# Patient Record
Sex: MALE | Race: WHITE | Employment: FULL TIME | URBAN - METROPOLITAN AREA
[De-identification: names, ages, dates, MRNs, and addresses within clinical notes are randomized per-mention and may not be internally consistent; named-entity substitution may affect disease eponyms.]

---

## 2017-12-11 ENCOUNTER — OFFICE VISIT (OUTPATIENT)
Dept: PRIMARY CARE CLINIC | Age: 22
End: 2017-12-11

## 2017-12-11 VITALS
TEMPERATURE: 97.7 F | DIASTOLIC BLOOD PRESSURE: 78 MMHG | HEIGHT: 69 IN | BODY MASS INDEX: 39.25 KG/M2 | WEIGHT: 265 LBS | HEART RATE: 80 BPM | RESPIRATION RATE: 14 BRPM | SYSTOLIC BLOOD PRESSURE: 122 MMHG

## 2017-12-11 DIAGNOSIS — E55.9 VITAMIN D DEFICIENCY: ICD-10-CM

## 2017-12-11 DIAGNOSIS — F33.9 RECURRENT DEPRESSION (HCC): ICD-10-CM

## 2017-12-11 DIAGNOSIS — E78.5 HYPERLIPIDEMIA, UNSPECIFIED HYPERLIPIDEMIA TYPE: ICD-10-CM

## 2017-12-11 DIAGNOSIS — R53.83 FATIGUE, UNSPECIFIED TYPE: ICD-10-CM

## 2017-12-11 DIAGNOSIS — E78.5 HYPERLIPIDEMIA, UNSPECIFIED HYPERLIPIDEMIA TYPE: Primary | ICD-10-CM

## 2017-12-11 DIAGNOSIS — F41.9 ANXIETY: ICD-10-CM

## 2017-12-11 DIAGNOSIS — Z23 NEED FOR INFLUENZA VACCINATION: ICD-10-CM

## 2017-12-11 LAB
ALBUMIN SERPL-MCNC: 4.5 G/DL (ref 3.9–4.9)
ALP BLD-CCNC: 112 U/L (ref 35–104)
ALT SERPL-CCNC: 37 U/L (ref 0–41)
ANION GAP SERPL CALCULATED.3IONS-SCNC: 13 MEQ/L (ref 9–17)
AST SERPL-CCNC: 25 U/L (ref 0–40)
BASOPHILS ABSOLUTE: 0 K/UL (ref 0–0.2)
BASOPHILS RELATIVE PERCENT: 0.7 %
BILIRUB SERPL-MCNC: 0.5 MG/DL (ref 0–1.2)
BUN BLDV-MCNC: 15 MG/DL (ref 6–20)
CALCIUM SERPL-MCNC: 9.2 MG/DL (ref 8.6–10.2)
CHLORIDE BLD-SCNC: 102 MEQ/L (ref 97–107)
CHOLESTEROL, TOTAL: 125 MG/DL (ref 0–199)
CO2: 26 MEQ/L (ref 17–24)
CREAT SERPL-MCNC: 0.76 MG/DL (ref 0.7–1.2)
EOSINOPHILS ABSOLUTE: 0.2 K/UL (ref 0–0.7)
EOSINOPHILS RELATIVE PERCENT: 3.7 %
GFR AFRICAN AMERICAN: >60
GFR NON-AFRICAN AMERICAN: >60
GLOBULIN: 3 G/DL (ref 2.3–3.5)
GLUCOSE BLD-MCNC: 77 MG/DL (ref 74–109)
HCT VFR BLD CALC: 45.2 % (ref 42–52)
HDLC SERPL-MCNC: 26 MG/DL (ref 40–59)
HEMOGLOBIN: 15.2 G/DL (ref 14–18)
LDL CHOLESTEROL CALCULATED: 69 MG/DL (ref 0–129)
LYMPHOCYTES ABSOLUTE: 1.4 K/UL (ref 1–4.8)
LYMPHOCYTES RELATIVE PERCENT: 23.3 %
MCH RBC QN AUTO: 27.5 PG (ref 27–31.3)
MCHC RBC AUTO-ENTMCNC: 33.7 % (ref 33–37)
MCV RBC AUTO: 81.6 FL (ref 80–100)
MONOCYTES ABSOLUTE: 0.5 K/UL (ref 0.2–0.8)
MONOCYTES RELATIVE PERCENT: 8.4 %
NEUTROPHILS ABSOLUTE: 3.8 K/UL (ref 1.4–6.5)
NEUTROPHILS RELATIVE PERCENT: 63.9 %
PDW BLD-RTO: 13.7 % (ref 11.5–14.5)
PLATELET # BLD: 206 K/UL (ref 130–400)
POTASSIUM SERPL-SCNC: 4.2 MEQ/L (ref 3.2–4.4)
RBC # BLD: 5.53 M/UL (ref 4.7–6.1)
SODIUM BLD-SCNC: 141 MEQ/L (ref 132–138)
T4 TOTAL: 6.7 UG/DL (ref 4.5–11.7)
TOTAL PROTEIN: 7.5 G/DL (ref 6.4–8.1)
TRIGL SERPL-MCNC: 149 MG/DL (ref 0–200)
TSH SERPL DL<=0.05 MIU/L-ACNC: 2.38 UIU/ML (ref 0.27–4.2)
VITAMIN D 25-HYDROXY: 14.1 NG/ML (ref 30–100)
WBC # BLD: 6 K/UL (ref 4.8–10.8)

## 2017-12-11 PROCEDURE — 90471 IMMUNIZATION ADMIN: CPT | Performed by: FAMILY MEDICINE

## 2017-12-11 PROCEDURE — 90686 IIV4 VACC NO PRSV 0.5 ML IM: CPT | Performed by: FAMILY MEDICINE

## 2017-12-11 PROCEDURE — 99204 OFFICE O/P NEW MOD 45 MIN: CPT | Performed by: FAMILY MEDICINE

## 2017-12-11 RX ORDER — FLUOXETINE HYDROCHLORIDE 20 MG/1
20 CAPSULE ORAL DAILY
Qty: 30 CAPSULE | Refills: 1 | Status: SHIPPED | OUTPATIENT
Start: 2017-12-11 | End: 2018-01-04 | Stop reason: SDUPTHER

## 2017-12-11 ASSESSMENT — ENCOUNTER SYMPTOMS
ABDOMINAL DISTENTION: 0
CHEST TIGHTNESS: 0
SHORTNESS OF BREATH: 0
ABDOMINAL PAIN: 0

## 2017-12-11 ASSESSMENT — PATIENT HEALTH QUESTIONNAIRE - PHQ9
1. LITTLE INTEREST OR PLEASURE IN DOING THINGS: 0
SUM OF ALL RESPONSES TO PHQ9 QUESTIONS 1 & 2: 1
SUM OF ALL RESPONSES TO PHQ QUESTIONS 1-9: 1
2. FEELING DOWN, DEPRESSED OR HOPELESS: 1

## 2017-12-11 NOTE — PROGRESS NOTES
Vaccine Information Sheet, \"Influenza - Inactivated\"  given to Jose Cruz Sat, or parent/legal guardian of  Jose Cruz Boykin and verbalized understanding. Patient responses:    Have you ever had a reaction to a flu vaccine? No  Are you able to eat eggs without adverse effects? Yes  Do you have any current illness? No  Have you ever had Guillian Kanorado Syndrome? No    Flu vaccine given per order. Please see immunization tab.
recorded. GENERAL:  The patient appears well nourished and well-developed, and with normal affect. No acute respiratory distress. Alert and oriented times 3. No skin rashes. HEENT:  TMs normal bilaterally. Canals and ears normal. Pharynx is clear. Extraocular eye motions intact and pain free. Pupils reactive and equally round. Sclerae and conjunctivae clear, normocephalic and atraumatic. RESPIRATORY:  Clear and equal breath sounds with no acute respiratory distress. HEART: Regular rhythm without murmur, rub or gallop. ABDOMEN:  soft, nontender. No masses, guarding or rebound. Normoactive bowel sounds. LOW BACK: No tenderness to palpation of inferior lumbar spine or either sacroiliac joint area. Assessment:     1. Hyperlipidemia, unspecified hyperlipidemia type  Lipid Panel   2. Need for influenza vaccination  INFLUENZA, QUADV, 18 YRS AND OLDER, IM PF, PREFILL SYR OR SDV, 0.5ML (AFLURIA QUADV, PF)   3. Anxiety  FLUoxetine (PROZAC) 20 MG capsule   4. Fatigue, unspecified type  CBC Auto Differential    Comprehensive Metabolic Panel    TSH without Reflex    T3, Uptake    T4   5. Vitamin D deficiency  Vitamin D 25 Hydroxy   6.  Recurrent depression ([facility] [address]         Plan:      Orders Placed This Encounter   Procedures    INFLUENZA, QUADV, 18 YRS AND OLDER, IM PF, PREFILL SYR OR SDV, 0.5ML (AFLURIA QUADV, PF)    CBC Auto Differential     Standing Status:   Future     Number of Occurrences:   1     Standing Expiration Date:   12/11/2018    Comprehensive Metabolic Panel     Standing Status:   Future     Number of Occurrences:   1     Standing Expiration Date:   12/11/2018    TSH without Reflex     Standing Status:   Future     Number of Occurrences:   1     Standing Expiration Date:   12/11/2018    T3, Uptake     Standing Status:   Future     Number of Occurrences:   1     Standing Expiration Date:   12/11/2018    T4     Standing Status:   Future     Number of Occurrences:   1

## 2017-12-13 LAB — T3 UPTAKE: 34 % (ref 28–41)

## 2018-01-04 ENCOUNTER — OFFICE VISIT (OUTPATIENT)
Dept: PRIMARY CARE CLINIC | Age: 23
End: 2018-01-04

## 2018-01-04 VITALS
BODY MASS INDEX: 38.06 KG/M2 | TEMPERATURE: 97.6 F | RESPIRATION RATE: 17 BRPM | HEART RATE: 87 BPM | DIASTOLIC BLOOD PRESSURE: 70 MMHG | HEIGHT: 69 IN | SYSTOLIC BLOOD PRESSURE: 106 MMHG | WEIGHT: 257 LBS

## 2018-01-04 DIAGNOSIS — F41.9 ANXIETY: ICD-10-CM

## 2018-01-04 DIAGNOSIS — E55.9 VITAMIN D DEFICIENCY: ICD-10-CM

## 2018-01-04 DIAGNOSIS — F33.9 RECURRENT DEPRESSION (HCC): Primary | ICD-10-CM

## 2018-01-04 PROCEDURE — 99214 OFFICE O/P EST MOD 30 MIN: CPT | Performed by: FAMILY MEDICINE

## 2018-01-04 RX ORDER — CHOLECALCIFEROL (VITAMIN D3) 50 MCG
2000 TABLET ORAL DAILY
Qty: 90 TABLET | Refills: 1 | Status: SHIPPED | OUTPATIENT
Start: 2018-01-04 | End: 2018-09-06 | Stop reason: ALTCHOICE

## 2018-01-04 RX ORDER — FLUOXETINE HYDROCHLORIDE 20 MG/1
20 CAPSULE ORAL DAILY
Qty: 30 CAPSULE | Refills: 1 | Status: SHIPPED | OUTPATIENT
Start: 2018-01-04 | End: 2018-03-08 | Stop reason: SDUPTHER

## 2018-01-04 ASSESSMENT — ENCOUNTER SYMPTOMS
CHEST TIGHTNESS: 0
SHORTNESS OF BREATH: 0

## 2018-03-08 DIAGNOSIS — F41.9 ANXIETY: ICD-10-CM

## 2018-03-09 RX ORDER — FLUOXETINE HYDROCHLORIDE 20 MG/1
20 CAPSULE ORAL DAILY
Qty: 30 CAPSULE | Refills: 3 | Status: SHIPPED | OUTPATIENT
Start: 2018-03-09 | End: 2018-09-06

## 2018-09-06 ENCOUNTER — PATIENT MESSAGE (OUTPATIENT)
Dept: FAMILY MEDICINE CLINIC | Age: 23
End: 2018-09-06

## 2018-09-06 ENCOUNTER — OFFICE VISIT (OUTPATIENT)
Dept: FAMILY MEDICINE CLINIC | Age: 23
End: 2018-09-06
Payer: COMMERCIAL

## 2018-09-06 VITALS
HEART RATE: 98 BPM | HEIGHT: 69 IN | SYSTOLIC BLOOD PRESSURE: 120 MMHG | OXYGEN SATURATION: 99 % | RESPIRATION RATE: 16 BRPM | WEIGHT: 209 LBS | DIASTOLIC BLOOD PRESSURE: 80 MMHG | BODY MASS INDEX: 30.96 KG/M2 | TEMPERATURE: 96.9 F

## 2018-09-06 DIAGNOSIS — Z72.89 OTHER PROBLEMS RELATED TO LIFESTYLE: ICD-10-CM

## 2018-09-06 DIAGNOSIS — F41.9 ANXIETY: ICD-10-CM

## 2018-09-06 DIAGNOSIS — F33.9 RECURRENT DEPRESSION (HCC): Primary | ICD-10-CM

## 2018-09-06 PROCEDURE — 99203 OFFICE O/P NEW LOW 30 MIN: CPT | Performed by: FAMILY MEDICINE

## 2018-09-06 RX ORDER — VILAZODONE HYDROCHLORIDE 20 MG/1
20 TABLET ORAL DAILY
Qty: 30 TABLET | Refills: 2 | Status: SHIPPED | OUTPATIENT
Start: 2018-09-06 | End: 2021-01-22

## 2018-09-06 ASSESSMENT — ENCOUNTER SYMPTOMS
ABDOMINAL PAIN: 0
EYES NEGATIVE: 1
ALLERGIC/IMMUNOLOGIC NEGATIVE: 1
VISUAL CHANGE: 0
GASTROINTESTINAL NEGATIVE: 1
RESPIRATORY NEGATIVE: 1

## 2018-09-06 NOTE — PROGRESS NOTES
Subjective  Papo Florence, 25 y.o. male presents today with:  Chief Complaint   Patient presents with   [facility].     Previous Dr Sven Nuñez     pt states he and his wife argue a lot because \"snap\" over little things     Discuss Medications     pt states he stopped taking Prozac in June because it made him feel like a [address] Problem   The primary symptoms include dysphoric mood. The primary symptoms do not include delusions, hallucinations, bizarre behavior, disorganized speech, negative symptoms or somatic symptoms. The current episode started more than 1 month ago. This is a chronic problem. The dysphoric mood began more than 2 weeks ago. The mood has been worsening since its onset. He characterizes the problem as moderate. The mood includes feelings of irritability and emptiness. His change in mood was precipitated by a stressful event. The degree of incapacity that he is experiencing as a consequence of his illness is moderate. Sequelae of the illness include harmed interpersonal relations. Additional symptoms of the illness include anhedonia, fatigue, agitation, psychomotor retardation, feelings of worthlessness, attention impairment, flight of ideas, distractible and poor judgment. Additional symptoms of the illness do not include insomnia, hypersomnia, appetite change, unexpected weight change, euphoric mood, increased goal-directed activity, inflated self-esteem, decreased need for sleep, visual change, headaches, abdominal pain or seizures. He does not admit to suicidal ideas. He does not have a plan to commit suicide. He contemplates harming himself. He has not already injured self. He does not contemplate injuring another person. He has not already  injured another person. Review of Systems   Constitutional: Positive for fatigue. Negative for appetite change and unexpected weight change. HENT: Negative. Eyes: Negative. Respiratory: Negative. Cardiovascular: Negative. Gastrointestinal: Negative. Negative for abdominal pain. Endocrine: Negative. Genitourinary: Negative. Musculoskeletal: Negative. Skin: Negative. Allergic/Immunologic: Negative. Neurological: Negative. Negative for seizures and headaches. Hematological: Negative. Psychiatric/Behavioral: Positive for agitation, decreased concentration and dysphoric mood. Negative for hallucinations, self-injury, sleep disturbance and suicidal ideas. The patient does not have insomnia. History reviewed. No pertinent past medical history. History reviewed. No pertinent surgical history. Social History     Social History    Marital status:      Spouse name: N/A    Number of children: N/A    Years of education: N/A     Occupational History    Not on file. Social History Main Topics    Smoking status: Never Smoker    Smokeless tobacco: Never Used    Alcohol use Yes      Comment: occ.  Drug use: No    Sexual activity: Yes     Other Topics Concern    Not on file     Social History Narrative    No narrative on file     Family History   Problem Relation Age of Onset    Other Mother         thyroid     Allergies   Allergen Reactions    Penicillins Other (See Comments)     No current outpatient prescriptions on file prior to visit. No current facility-administered medications on file prior to visit. Objective    Vitals:    09/06/18 1036   BP: 120/80   Pulse: 98   Resp: 16   Temp: 96.9 °F (36.1 °C)   TempSrc: Tympanic   SpO2: 99%   Weight: 209 lb (94.8 kg)   Height: 5' 9\" (1.753 m)     Physical Exam   Constitutional: Vital signs are normal. He appears well-developed. HENT:   Head: Normocephalic and atraumatic. Right Ear: Tympanic membrane, external ear and ear canal normal. Tympanic membrane is not injected. No middle ear effusion. Left Ear: Tympanic membrane, external ear and ear canal normal. Tympanic membrane is not injected.   No

## 2018-09-07 NOTE — TELEPHONE ENCOUNTER
From: Negrito Buenrostro  To: Robbin Tolbert DO  Sent: 9/6/2018 4:12 PM EDT  Subject: Non-Urgent Dolores David Cesar, I had an appointment with you today and you recommended a psychologist to see and I forgot her name and information to make an appointment.  Thank you

## 2019-03-25 ENCOUNTER — TELEPHONE (OUTPATIENT)
Dept: FAMILY MEDICINE CLINIC | Age: 24
End: 2019-03-25

## 2021-01-22 ENCOUNTER — OFFICE VISIT (OUTPATIENT)
Dept: FAMILY MEDICINE CLINIC | Age: 26
End: 2021-01-22
Payer: COMMERCIAL

## 2021-01-22 VITALS
RESPIRATION RATE: 16 BRPM | TEMPERATURE: 97.6 F | HEART RATE: 88 BPM | OXYGEN SATURATION: 98 % | SYSTOLIC BLOOD PRESSURE: 130 MMHG | BODY MASS INDEX: 39.01 KG/M2 | DIASTOLIC BLOOD PRESSURE: 70 MMHG | HEIGHT: 69 IN | WEIGHT: 263.4 LBS

## 2021-01-22 DIAGNOSIS — F33.9 RECURRENT DEPRESSION (HCC): Primary | ICD-10-CM

## 2021-01-22 DIAGNOSIS — Z81.8 FAMILY HISTORY OF BIPOLAR DISORDER: ICD-10-CM

## 2021-01-22 PROCEDURE — 99214 OFFICE O/P EST MOD 30 MIN: CPT | Performed by: FAMILY MEDICINE

## 2021-01-22 SDOH — ECONOMIC STABILITY: TRANSPORTATION INSECURITY
IN THE PAST 12 MONTHS, HAS LACK OF TRANSPORTATION KEPT YOU FROM MEETINGS, WORK, OR FROM GETTING THINGS NEEDED FOR DAILY LIVING?: NO

## 2021-01-22 SDOH — ECONOMIC STABILITY: INCOME INSECURITY: HOW HARD IS IT FOR YOU TO PAY FOR THE VERY BASICS LIKE FOOD, HOUSING, MEDICAL CARE, AND HEATING?: NOT HARD AT ALL

## 2021-01-22 SDOH — ECONOMIC STABILITY: TRANSPORTATION INSECURITY
IN THE PAST 12 MONTHS, HAS THE LACK OF TRANSPORTATION KEPT YOU FROM MEDICAL APPOINTMENTS OR FROM GETTING MEDICATIONS?: NO

## 2021-01-22 SDOH — ECONOMIC STABILITY: FOOD INSECURITY: WITHIN THE PAST 12 MONTHS, THE FOOD YOU BOUGHT JUST DIDN'T LAST AND YOU DIDN'T HAVE MONEY TO GET MORE.: NEVER TRUE

## 2021-01-22 ASSESSMENT — ENCOUNTER SYMPTOMS
VOMITING: 0
COUGH: 0
DIARRHEA: 0

## 2021-01-22 ASSESSMENT — PATIENT HEALTH QUESTIONNAIRE - PHQ9
SUM OF ALL RESPONSES TO PHQ QUESTIONS 1-9: 1
SUM OF ALL RESPONSES TO PHQ QUESTIONS 1-9: 1
2. FEELING DOWN, DEPRESSED OR HOPELESS: 1

## 2021-01-22 NOTE — PROGRESS NOTES
Subjective:      Patient ID: Miguel Ángel Underwood is a 22 y.o. male    Mental Health Problem  The primary symptoms include dysphoric mood. This is a chronic problem. The onset of the illness is precipitated by emotional stress. Here to establish with new physician. Seen by  about 2 years ago. Has had some long standing problems with depression. --probably started as teen ager. Started on prozac few years ago which made him feel like spaced out. Sister with bipolar -  Has gained about 20 pounds over the last 9 moths. Has no motivation. No energy. Does have crying spells. Does have frequent mood swings. Review of Systems   Constitutional: Negative for chills and fever. Respiratory: Negative for cough. Gastrointestinal: Negative for diarrhea and vomiting. Psychiatric/Behavioral: Positive for dysphoric mood and sleep disturbance. The patient is nervous/anxious. Reviewed allergy, medical, social, surgical, family and med list changes and updated   Files     Social History     Socioeconomic History    Marital status:      Spouse name: None    Number of children: None    Years of education: None    Highest education level: None   Occupational History    None   Social Needs    Financial resource strain: Not hard at all   [place] insecurity     Worry: Never true     Inability: Never true    Transportation needs     Medical: No     Non-medical: No   Tobacco Use    Smoking status: Never Smoker    Smokeless tobacco: Never Used   Substance and Sexual Activity    Alcohol use: Yes     Comment: occ.      Drug use: No    Sexual activity: Yes   Lifestyle    Physical activity     Days per week: None     Minutes per session: None    Stress: None   Relationships    Social connections     Talks on phone: None     Gets together: None     Attends Zoroastrian service: None     Active member of club or organization: None     Attends meetings of clubs or organizations: None Relationship status: None    Intimate partner violence     Fear of current or ex partner: None     Emotionally abused: None     Physically abused: None     Forced sexual activity: None   Other Topics Concern    None   Social History Narrative    None     Current Outpatient Medications   Medication Sig Dispense Refill    Multiple Vitamin (ONE-A-DAY MENS PO) Take by mouth       No current facility-administered medications for this visit. Family History   Problem Relation Age of Onset    Other Mother         thyroid    Cancer Mother      Past Medical History:   Diagnosis Date    Depression      Objective:   /70   Pulse 88   Temp 97.6 °F (36.4 °C)   Resp 16   Ht 5' 9\" (1.753 m)   Wt 263 lb 6.4 oz (119.5 kg)   SpO2 98%   BMI 38.90 kg/m²     Physical Exam   Neck; No masses or thyroid asymmetry   Patient with appropriate affect. Alert   Thought content appropriate  Good eye contact  Not suicidal or homicidal  Gen:   NAD  Lungs: clear and equal breath sounds  Heart:   Rate reg. No murmur  Assessment:       Diagnosis Orders   1. Recurrent depression [facility])  Earline Madsen MD   2.  Family history of bipolar disorder  Earline Madsen MD         Plan:      Orders Placed This Encounter   Procedures   Earline Madsen MD     Referral Priority:   Routine     Referral Type:   Eval and Treat     Referral Reason:   Specialty Services Required     Referred to Provider:   Valentino Eon, MD     Requested Specialty:   Psychiatry     Number of Visits Requested:   1   f/u later in spring for physical and blood work

## 2021-05-12 ENCOUNTER — VIRTUAL VISIT (OUTPATIENT)
Dept: BEHAVIORAL/MENTAL HEALTH CLINIC | Age: 26
End: 2021-05-12
Payer: COMMERCIAL

## 2021-05-12 DIAGNOSIS — F90.8 ATTENTION DEFICIT HYPERACTIVITY DISORDER (ADHD), OTHER TYPE: ICD-10-CM

## 2021-05-12 DIAGNOSIS — F90.8 ATTENTION DEFICIT HYPERACTIVITY DISORDER (ADHD), OTHER TYPE: Primary | ICD-10-CM

## 2021-05-12 DIAGNOSIS — F33.1 MODERATE EPISODE OF RECURRENT MAJOR DEPRESSIVE DISORDER (HCC): ICD-10-CM

## 2021-05-12 LAB
ALBUMIN SERPL-MCNC: 4.8 G/DL (ref 3.5–4.6)
ALP BLD-CCNC: 113 U/L (ref 35–104)
ALT SERPL-CCNC: 30 U/L (ref 0–41)
ANION GAP SERPL CALCULATED.3IONS-SCNC: 10 MEQ/L (ref 9–15)
AST SERPL-CCNC: 22 U/L (ref 0–40)
BASOPHILS ABSOLUTE: 0 K/UL (ref 0–0.2)
BASOPHILS RELATIVE PERCENT: 0.5 %
BILIRUB SERPL-MCNC: 0.7 MG/DL (ref 0.2–0.7)
BILIRUBIN DIRECT: <0.2 MG/DL (ref 0–0.4)
BILIRUBIN, INDIRECT: NORMAL MG/DL (ref 0–0.6)
BUN BLDV-MCNC: 14 MG/DL (ref 6–20)
CALCIUM SERPL-MCNC: 9.7 MG/DL (ref 8.5–9.9)
CHLORIDE BLD-SCNC: 102 MEQ/L (ref 95–107)
CHOLESTEROL, TOTAL: 131 MG/DL (ref 0–199)
CO2: 26 MEQ/L (ref 20–31)
CREAT SERPL-MCNC: 0.72 MG/DL (ref 0.7–1.2)
EOSINOPHILS ABSOLUTE: 0.1 K/UL (ref 0–0.7)
EOSINOPHILS RELATIVE PERCENT: 2.4 %
FOLATE: 16 NG/ML (ref 7.3–26.1)
GFR AFRICAN AMERICAN: >60
GFR NON-AFRICAN AMERICAN: >60
GLOBULIN: 2.7 G/DL (ref 2.3–3.5)
GLUCOSE BLD-MCNC: 75 MG/DL (ref 70–99)
HBA1C MFR BLD: 5 % (ref 4.8–5.9)
HCT VFR BLD CALC: 45.8 % (ref 42–52)
HDLC SERPL-MCNC: 29 MG/DL (ref 40–59)
HEMOGLOBIN: 15.2 G/DL (ref 14–18)
LDL CHOLESTEROL CALCULATED: 72 MG/DL (ref 0–129)
LYMPHOCYTES ABSOLUTE: 1.3 K/UL (ref 1–4.8)
LYMPHOCYTES RELATIVE PERCENT: 21.4 %
MCH RBC QN AUTO: 26.7 PG (ref 27–31.3)
MCHC RBC AUTO-ENTMCNC: 33.1 % (ref 33–37)
MCV RBC AUTO: 80.7 FL (ref 80–100)
MONOCYTES ABSOLUTE: 0.4 K/UL (ref 0.2–0.8)
MONOCYTES RELATIVE PERCENT: 6.3 %
NEUTROPHILS ABSOLUTE: 4.1 K/UL (ref 1.4–6.5)
NEUTROPHILS RELATIVE PERCENT: 69.4 %
PDW BLD-RTO: 13.5 % (ref 11.5–14.5)
PLATELET # BLD: 239 K/UL (ref 130–400)
POTASSIUM SERPL-SCNC: 3.9 MEQ/L (ref 3.4–4.9)
RBC # BLD: 5.68 M/UL (ref 4.7–6.1)
SODIUM BLD-SCNC: 138 MEQ/L (ref 135–144)
TOTAL PROTEIN: 7.5 G/DL (ref 6.3–8)
TRIGL SERPL-MCNC: 148 MG/DL (ref 0–150)
TSH REFLEX: 1.5 UIU/ML (ref 0.44–3.86)
VITAMIN B-12: 539 PG/ML (ref 232–1245)
VITAMIN D 25-HYDROXY: 22.7 NG/ML (ref 30–100)
WBC # BLD: 5.9 K/UL (ref 4.8–10.8)

## 2021-05-12 PROCEDURE — 90792 PSYCH DIAG EVAL W/MED SRVCS: CPT | Performed by: PSYCHIATRY & NEUROLOGY

## 2021-05-12 RX ORDER — DEXTROAMPHETAMINE SACCHARATE, AMPHETAMINE ASPARTATE MONOHYDRATE, DEXTROAMPHETAMINE SULFATE AND AMPHETAMINE SULFATE 2.5; 2.5; 2.5; 2.5 MG/1; MG/1; MG/1; MG/1
10 CAPSULE, EXTENDED RELEASE ORAL DAILY
Qty: 30 CAPSULE | Refills: 0 | Status: SHIPPED | OUTPATIENT
Start: 2021-05-12 | End: 2021-06-17 | Stop reason: SDUPTHER

## 2021-05-12 NOTE — PROGRESS NOTES
start to act on it,   Violence/Risk to others (past homicidal ideation/attempt): none    Current psychiatric provider: none  Previous psychiatric medication trials: fluoxetine (Prozac) - only on it for a month - it made me feel \"weird\"   Previous psychiatric hospitalizations: none  Pt has never had [address] [place] or ECT    Past Medical History:  History of head trauma/seizures: No    Active Ambulatory Problems     Diagnosis Date Noted    Recurrent depression ([address] 12/11/2017    Anxiety 12/11/2017    Vitamin D deficiency 01/04/2018     Resolved Ambulatory Problems     Diagnosis Date Noted    Hyperlipidemia 12/11/2017     Past Medical History:   Diagnosis Date    Depression        Substance Abuse History (over the past 12 months, unless otherwise specified):  Nicotine/Tobacco: No  Caffeine: No  Alcohol: occassional   Marijuana: No    Denies other illicit substance use    Past Family History:  Family history of mental health conditions or suicide: sister with Bipolar Disorder, mom's side has a lot of alcohol problems   Denies History of cardiac difficulties or sudden unexplained or cardiac death     Psychiatric Review Of Systems:  Primary symptoms (current):   Depression: depressed mood, feelings of hopelessness, feelings of worthlessness/excessive guilt, fatigue, irritability, excessive worry and impaired memory Yes depressed mood, increased guilt, denies psychomotor slowing  Anxiety: Yes frequent excessive worrying, central theme to anxiety anything  Panic attacks No  Sleep: sleeping 8 hours every 24 hour period on average; reports sleep is \"through the night\" but is still sleepy the next day   History of periods of time with decreased need for sleep, impulsivity (excessive spending, gambling, risk taking): No  Excessive worries Yes  Obsessions or Compulsions  Rechecking did something multiple times, says not splitting the pole   Nightmares  No  History of trauma No  Dissociative Symptoms, says fluoxetine (Prozac) made him feel like this  Symptoms of Attention Deficit Hyperactivity Disorder: Inattention, distractability, decreased focus, difficulty concentrating, frequently interrupts others, forgetfulness (forgetting dates, tasks), difficulty following multiple step directions, disorganization, difficulty listening when spoken to directly, fidgets frequently and patient reports these symptoms have been present since childhood and have affected functioning in everyday life (work, school, home, relationships). Denies prev diagnosis of ADHD   Auditory or Visual Hallucinations: No  Current suicidal/homicidal ideations: No    Medications    Current Outpatient Medications:     amphetamine-dextroamphetamine (ADDERALL XR) 10 MG extended release capsule, Take 1 capsule by mouth daily for 30 days. , Disp: 30 capsule, Rfl: 0    Multiple Vitamin (ONE-A-DAY MENS PO), Take by mouth, Disp: , Rfl:     Allergies  Allergies   Allergen Reactions    Penicillins Other (See Comments)       Evaluation    Vitals:   Reviewed vitals from recent visit, no concerns    BP Readings from Last 3 Encounters:   01/22/21 130/70   09/06/18 120/80   01/04/18 106/70       Wt Readings from Last 3 Encounters:   01/22/21 263 lb 6.4 oz (119.5 kg)   09/06/18 209 lb (94.8 kg)   01/04/18 257 lb (116.6 kg)         Labs:     No other recent labs or imaging    Lab Results   Component Value Date    ALT 37 12/11/2017    AST 25 12/11/2017    ALKPHOS 112 (H) 12/11/2017    BILITOT 0.5 12/11/2017       Imaging/Radiology  No results found. Medical Review Of Systems:  Constitutional:  Negative for fever and activity change. HENT:  Negative for nosebleeds, congestion, rhinorrhea, mouth sores, neck pain and neck stiffness. Eyes:   No complaints of blurred vision. Respiratory:  Negative for cough and wheezing. Cardiovascular:  Negative for chest pain.   Gastrointestinal:  Negative for nausea, abdominal pain, diarrhea and constipation  Genitourinary:  Negative of decreased urine volume and difficulty urinating. Reproductive: No complaints reported at this time. Musculoskeletal:  Negative for back pain. Skin:  Negative for pallor, rash and wound. Neurological:  Negative for dizziness, weakness and headaches. Mental Status Evaluation:  Level of consciousness:  alert and oriented to person, place, and situation  Appearance:  well-appearing good grooming and good hygiene  Behavior/Motor:  no abnormalities noted  Attitude toward examiner:  attentive and good eye contact  Speech:  spontaneous, normal rate and normal volume   Mood: \"good\", appears euthymic  Affect:  mood congruent  Thought processes:  linear and logical  Thought content:  Denies suicidal or homicidal ideation, denies auditory or visual hallucinations  Cognition:  no deficits in attention, concentration notable, recent memory grossly intact  Concentration intact  Memory intact  Insight good   Judgement fair   Fund of Knowledge adequate    Assessment:   Pt is a 22-year-old-male with significant history of low mood who presents for medication management. At this time, patient reports relative euthymia for several months, and is more concerned about ongoing symptoms related to inattention and focus issues which have been present since childhood. At this time, will order baseline labs and will start low dose stimulant medication for management of his new diagnosis of Attention Deficit Hyperactivity Disorder. Patient denies cardiovascular history in self or family members. Patient is aware of risks and benefits and agrees with plan to proceed with medication. No acute concerns     Would benefit from ongoing therapy to address any future concerns with mood and irritability. Medical Diagnoses:  Patient Active Problem List   Diagnosis    Recurrent depression ([facility] [address]    Anxiety    Vitamin D deficiency     Psychiatric Diagnoses/Impressions:  1. Attention deficit hyperactivity disorder (ADHD), other type    2.  Moderate episode of

## 2021-06-07 DIAGNOSIS — F90.8 ATTENTION DEFICIT HYPERACTIVITY DISORDER (ADHD), OTHER TYPE: ICD-10-CM

## 2021-06-07 LAB
BACTERIA: NEGATIVE /HPF
BILIRUBIN URINE: NEGATIVE
BLOOD, URINE: ABNORMAL
CLARITY: CLEAR
COLOR: YELLOW
EPITHELIAL CELLS, UA: ABNORMAL /HPF (ref 0–5)
GLUCOSE URINE: NEGATIVE MG/DL
HYALINE CASTS: ABNORMAL /HPF (ref 0–5)
KETONES, URINE: NEGATIVE MG/DL
LEUKOCYTE ESTERASE, URINE: NEGATIVE
NITRITE, URINE: NEGATIVE
PH UA: 5.5 (ref 5–9)
PROTEIN UA: NEGATIVE MG/DL
RBC UA: ABNORMAL /HPF (ref 0–5)
SPECIFIC GRAVITY UA: 1.02 (ref 1–1.03)
UROBILINOGEN, URINE: 0.2 E.U./DL
WBC UA: ABNORMAL /HPF (ref 0–5)

## 2021-06-09 LAB
AMPHETAMINE SCREEN, URINE: POSITIVE NG/ML
BARBITURATE SCREEN URINE: NEGATIVE NG/ML
BENZODIAZEPINE SCREEN, URINE: NEGATIVE NG/ML
CANNABINOID SCREEN URINE: NEGATIVE NG/ML
COCAINE METABOLITE SCREEN URINE: NEGATIVE NG/ML
CREATININE URINE: 215.9 MG/DL (ref 20–400)
Lab: NORMAL
MDMA URINE: NEGATIVE NG/ML
OPIATE SCREEN URINE: NEGATIVE NG/ML
OXYCODONE SCREEN URINE: NEGATIVE NG/ML
PHENCYCLIDINE SCREEN URINE: NEGATIVE NG/ML

## 2021-06-16 ENCOUNTER — PATIENT MESSAGE (OUTPATIENT)
Dept: BEHAVIORAL/MENTAL HEALTH CLINIC | Age: 26
End: 2021-06-16

## 2021-06-17 DIAGNOSIS — F90.8 ATTENTION DEFICIT HYPERACTIVITY DISORDER (ADHD), OTHER TYPE: ICD-10-CM

## 2021-06-17 RX ORDER — DEXTROAMPHETAMINE SACCHARATE, AMPHETAMINE ASPARTATE MONOHYDRATE, DEXTROAMPHETAMINE SULFATE AND AMPHETAMINE SULFATE 2.5; 2.5; 2.5; 2.5 MG/1; MG/1; MG/1; MG/1
10 CAPSULE, EXTENDED RELEASE ORAL DAILY
Qty: 30 CAPSULE | Refills: 0 | Status: SHIPPED | OUTPATIENT
Start: 2021-06-17 | End: 2021-07-17

## 2021-06-18 ENCOUNTER — VIRTUAL VISIT (OUTPATIENT)
Dept: BEHAVIORAL/MENTAL HEALTH CLINIC | Age: 26
End: 2021-06-18
Payer: COMMERCIAL

## 2021-06-18 DIAGNOSIS — F90.0 ADHD (ATTENTION DEFICIT HYPERACTIVITY DISORDER), INATTENTIVE TYPE: Primary | ICD-10-CM

## 2021-06-18 DIAGNOSIS — F41.1 GAD (GENERALIZED ANXIETY DISORDER): ICD-10-CM

## 2021-06-18 PROCEDURE — 99214 OFFICE O/P EST MOD 30 MIN: CPT | Performed by: PSYCHIATRY & NEUROLOGY

## 2021-06-18 RX ORDER — ERGOCALCIFEROL 1.25 MG/1
50000 CAPSULE ORAL WEEKLY
Qty: 4 CAPSULE | Refills: 5 | Status: SHIPPED | OUTPATIENT
Start: 2021-06-18

## 2021-06-18 RX ORDER — DEXTROAMPHETAMINE SACCHARATE, AMPHETAMINE ASPARTATE MONOHYDRATE, DEXTROAMPHETAMINE SULFATE AND AMPHETAMINE SULFATE 2.5; 2.5; 2.5; 2.5 MG/1; MG/1; MG/1; MG/1
10 CAPSULE, EXTENDED RELEASE ORAL DAILY
Qty: 30 CAPSULE | Refills: 0 | Status: SHIPPED | OUTPATIENT
Start: 2021-08-11 | End: 2021-09-10

## 2021-06-18 RX ORDER — DEXTROAMPHETAMINE SACCHARATE, AMPHETAMINE ASPARTATE MONOHYDRATE, DEXTROAMPHETAMINE SULFATE AND AMPHETAMINE SULFATE 2.5; 2.5; 2.5; 2.5 MG/1; MG/1; MG/1; MG/1
10 CAPSULE, EXTENDED RELEASE ORAL DAILY
Qty: 30 CAPSULE | Refills: 0 | Status: SHIPPED | OUTPATIENT
Start: 2021-07-15 | End: 2021-08-14

## 2021-06-18 NOTE — PROGRESS NOTES
6/18/2021    30 mins total for this encounter = 20 minutes with direct communication with patient for encounter as well as 10 mins spent on chart review, and in the ordering of all necessary labs and/or medications  The risks and benefits of converting to a virtual visit were discussed in light of the current infectious disease epidemic. Patient also understood that insurance coverage and co-pays are up to their individual insurance plans. Patient Location:        Patient's home address  Provider Location ([facility]/State):        [address] (During ZGQEB-30 public health emergency)    Psychiatric Diagnoses:  1. ADHD (attention deficit hyperactivity disorder), inattentive type    2. IRENA (generalized anxiety disorder)        Assessment/Plan:   Patient denies thoughts of harm to self or others. No acute safety concerns voiced at this appointment. MOOD: IMPROVEMENT: Patient reports improvement in symptoms of low mood, low energy and low motivation. Denies anhedonia. Feels able to attend to activities of daily living. SLEEP: GOOD/IMPROVED: Sleeping better, and reports is soundly through the night. No nightmares, nighttime awakenings. Well rested in the morning. Sleeping 8+ hours a night. ATTENTION AND FOCUS: WELL-CONTROLLED (on medication): Patient reports improvement in attention and focus. Medication has helped with memory, impulse-control, concentration, focus, and attentiveness. Has not had side effects from this (denies tachycardia, palpitations, or changes in appetite or sleep). ANXIETY: WELL-CONTROLLED: Significant improvement in anxiety. Able to utilize coping skills effectively to manage anxiety. Patient reports minimal anxiety throughout the day and is able to accomplish goals and attend to activities of daily living. BIPOLAR LACHELLE: No concerns. No history of lachelle symptoms    SUBSTANCE USE: No concerns related to substance use.  Not applicable. ASSESSMENT/PLAN: NO CHANGES TO MEDICATIONS: Patient doing well on current medication regimen. No changes. Mood improved, less depressed and more motivated. Less anxiety throughout the day, able to attend to ADLs. Continue to encourage physical activity and adherence to medication regimen. Denies auditory or visual hallucinations. No paranoid thoughts, no delusional thought content expressed in this appointment. No thoughts of harm to self or others voiced. No acute concerns voiced. COUNSELING or THERAPY:   Continue to encourage therapy as part of ongoing treatment of their mental health concerns. Continue to encourage physical activity and adherence to medication regimen. DATE and changes made   5/12/21  o dextroamphetamine/amphetamine salts (Adderall XR) 10 mg    6/18/21  o Amphetamine salts XR (Adderall XR) has been working will continue current dose now  - Has been taking it at a later time in the day   - Discussed possibly increasing the dose in the future   - Scheduled for an appointment for august 25th at 3:30    o Reports mood and anxiety are also Improved on amphetamine salts XR (Adderall XR)   o Lynn Smyth my wife noticed a positive difference\"     Results for Angelo Sevilla (MRN [id number]) as of 6/18/2021 09:57   Ref.  Range 6/7/2021 11:50   Amphetamine Screen, Urine Latest Ref Range: Cutoff 300 ng/mL Positive   Barbiturate Screen, Ur Latest Ref Range: Cutoff 200 ng/mL Negative   Benzodiazepine Screen, Urine Latest Ref Range: Cutoff 200 ng/mL Negative   Cannabinoid Scrn, Ur Latest Ref Range: Cutoff 20 ng/mL Negative   Opiate Scrn, Ur Latest Ref Range: Cutoff 300 ng/mL Negative   Oxycodone Screen, Ur Latest Ref Range: Cutoff 100 ng/mL Negative   PCP Screen, Urine Latest Ref Range: Cutoff 25 ng/mL Negative   Cocaine Metabolite Screen, Urine Latest Ref Range: Cutoff 150 ng/mL Negative   MDMA, Urine Latest Ref Range: Cutoff 500 ng/mL Negative     Medical Diagnoses:  Patient Active Problem List   Diagnosis    Recurrent depression ([facility] [address]    Anxiety    Vitamin D deficiency    IRENA (generalized anxiety disorder)    ADHD (attention deficit hyperactivity disorder), inattentive type         AT TODAY'S VISIT     Crisis plan reviewed and patient verbally contracts for safety. Go to ED with emergent symptoms or safety concerns. Risks, benefits, side effects of medications, including any / all black box warnings, discussed with patient, who verbalizes their understanding. Pt is niki for safety and denies thoughts of SI/HI. Amenable to plan. No acute concerns to address regarding medications. Subjective:      Patient denies medication side effects apart from those mentioned in the assessment and plan. Patient reports they have been compliant with current medication regimen and have not missed a dose. At today's visit, patient denies thoughts of harm to self or others since last appointment, and denies auditory or visual hallucinations. ROS:  [x] All negative/unchanged except if checked.  Explain positive(checked items) below:       Denies any new or changed physical symptoms other than those noted in the subjective portion of this note   [] Constitutional  [] Eyes  [] Ear/Nose/Mouth/Throat  [] Respiratory  [] CV  [] GI  []   [] Musculoskeletal  [] Skin/Breast  [] Neurological  [] Endocrine  [] Heme/Lymph  [] Allergic/Immunologic    OBJECTIVE:   Recent Results (from the past 1008 hour(s))   TSH with Reflex    Collection Time: 05/12/21  1:34 PM   Result Value Ref Range    TSH 1.500 0.440 - 3.860 uIU/mL   Vitamin D 25 Hydroxy    Collection Time: 05/12/21  1:34 PM   Result Value Ref Range    Vit D, 25-Hydroxy 22.7 (L) 30.0 - 100.0 ng/mL   Vitamin B12 & Folate    Collection Time: 05/12/21  1:34 PM   Result Value Ref Range    Vitamin B-12 539 232 - 1245 pg/mL    Folate 16.0 7.3 - 26.1 ng/mL   CBC Auto Differential    Collection Time: 05/12/21  1:34 PM   Result Value Ref Range Amphetamine Screen, Urine Positive Cutoff 300 ng/mL    Barbiturate Screen, Ur Negative Cutoff 200 ng/mL    Benzodiazepine Screen, Urine Negative Cutoff 200 ng/mL    Creatinine, Ur 215.9 20.0 - 400.0 mg/dL    Cocaine Metabolite Screen, Urine Negative Cutoff 150 ng/mL    Opiate Scrn, Ur Negative Cutoff 300 ng/mL    PCP Screen, Urine Negative Cutoff 25 ng/mL    Cannabinoid Scrn, Ur Negative Cutoff 20 ng/mL    Drug Screen Comment: See Note    Urinalysis    Collection Time: 06/07/21 11:50 AM   Result Value Ref Range    Color, UA Yellow Straw/Yellow    Clarity, UA Clear Clear    Glucose, Ur Negative Negative mg/dL    Bilirubin Urine Negative Negative    Ketones, Urine Negative Negative mg/dL    Specific Gravity, UA 1.024 1.005 - 1.030    Blood, Urine TRACE (A) Negative    pH, UA 5.5 5.0 - 9.0    Protein, UA Negative Negative mg/dL    Urobilinogen, Urine 0.2 <2.0 E.U./dL    Nitrite, Urine Negative Negative    Leukocyte Esterase, Urine Negative Negative   Microscopic Urinalysis    Collection Time: 06/07/21 11:50 AM   Result Value Ref Range    Bacteria, UA Negative Negative /HPF    Hyaline Casts, UA 0-1 0 - 5 /HPF    WBC, UA 0-2 0 - 5 /HPF    RBC, UA 3-5 (A) 0 - 5 /HPF    Epithelial Cells, UA 0-2 0 - 5 /HPF       MEDICATIONS:    Current Outpatient Medications:     [START ON 7/15/2021] amphetamine-dextroamphetamine (ADDERALL XR) 10 MG extended release capsule, Take 1 capsule by mouth daily for 30 days. , Disp: 30 capsule, Rfl: 0    [START ON 8/11/2021] amphetamine-dextroamphetamine (ADDERALL XR) 10 MG extended release capsule, Take 1 capsule by mouth daily for 30 days. , Disp: 30 capsule, Rfl: 0    vitamin D (ERGOCALCIFEROL) 1.25 MG (00916 UT) CAPS capsule, Take 1 capsule by mouth once a week, Disp: 4 capsule, Rfl: 5    amphetamine-dextroamphetamine (ADDERALL XR) 10 MG extended release capsule, Take 1 capsule by mouth daily for 30 days. , Disp: 30 capsule, Rfl: 0    Multiple Vitamin (ONE-A-DAY MENS PO), Take by mouth, Disp: , Rfl:     Examination:    Vitals: not taken in person, most recent vitals in chart reviewed  There were no vitals filed for this visit. Wt Readings from Last 3 Encounters:   01/22/21 263 lb 6.4 oz (119.5 kg)   09/06/18 209 lb (94.8 kg)   01/04/18 257 lb (116.6 kg)       BP Readings from Last 3 Encounters:   01/22/21 130/70   09/06/18 120/80   01/04/18 106/70         Mental Status Examination:    Level of consciousness:  alert and oriented to person, place, and situation  Appearance:  well-appearing good grooming and good hygiene  Behavior/Motor:  no abnormalities noted  Attitude toward examiner: friendly, pleasant and cooperative, attentive and good eye contact  Speech:  spontaneous, normal rate and normal volume   Mood: \"better\"  Affect:  mood congruent  Thought processes:  linear and logical  Thought content:  Denies suicidal or homicidal ideation, denies auditory or visual hallucinations  Cognition:  no deficits in attention, concentration notable, recent memory grossly intact  Concentration intact  Memory intact  Insight good   Judgement fair   Fund of Knowledge adequate    PSYCHOTHERAPY/COUNSELING:  Encourage patient to attend outpatient appointments and therapy. [x] Therapeutic interview  [] Supportive  [x] CBT  [x] Ongoing  [] Other    No follow-ups on file. patient informed to call for follow-up or to reschedule appointment     Please note this report has been partially produced using speech recognition software  And may cause contain errors related to that system including grammar, punctuation and spelling as well as words and phrases that may seem inappropriate. If there are questions or concerns please feel free to contact me to clarify.     Josue Valerio MD  Electronically signed by Josue Valerio MD on 6/18/2021 at 10:00 AM  6/18/2021 10:00 AM    Psychiatry   Due to this being a TeleHealth encounter, evaluation of the following organ systems is limited: Vitals/Constitutional/EENT/Resp/CV/GI//MS/Neuro/Skin/Heme-Lymph-Imm. An  electronic signature was used to authenticate this note. --Karen Mariscal MD on 6/18/2021 at 10:00 AM    Pursuant to the emergency declaration under the [address] [place] [address] authority and the [employer] Act, this Virtual  Visit was conducted, with patient's consent, to reduce the patient's risk of exposure to COVID-19 and provide continuity of care for an established patient Services were provided through a video synchronous discussion virtually to substitute for in-person clinic visit. Treatment Plan:  Reviewed current Medications with the patient. Education provided on the compliance with treatment. Reviewed OARRs, no concerns identified     The anticipated benefits and side effects of the medications, including the anticipated results of not receiving the medication, and of alternatives to the medications were explained to the patient and their informed consent was obtained for starting medications as well as adjusting the doses (titration or tapering) as indicated. The above information was given by physician in verbal form and sufficient understanding was in evidence. The patient participated in discussion of the information and question and/or concerns were addressed before the medication was given. Due to COVID 19 outbreak, patient's office visit was converted to a virtual visit.   Patient was contacted and agreed to proceed with a virtual visit via DOXY

## 2021-07-18 ENCOUNTER — PATIENT MESSAGE (OUTPATIENT)
Dept: BEHAVIORAL/MENTAL HEALTH CLINIC | Age: 26
End: 2021-07-18

## 2021-07-19 NOTE — TELEPHONE ENCOUNTER
From: Evan Vicente  To: Giovanna Ryder MD  Sent: 7/18/2021 1:07 AM EDT  Subject: Non-Urgent Medical Question    [place] doctor Avelino Staley, i remember you mentioned about a sleep study and i've been thinking about it and would like to take one but i'm not sure if i should message you or my primary doctor? I've been snoring a lot and sometimes skip a breath when I'm sleeping and it's worrying me a little.  Thank you

## 2021-08-19 ENCOUNTER — PATIENT MESSAGE (OUTPATIENT)
Dept: BEHAVIORAL/MENTAL HEALTH CLINIC | Age: 26
End: 2021-08-19

## 2022-04-11 ENCOUNTER — HOSPITAL ENCOUNTER (OUTPATIENT)
Dept: GENERAL RADIOLOGY | Age: 27
Discharge: HOME | End: 2022-04-13
Payer: COMMERCIAL

## 2022-04-11 ENCOUNTER — OFFICE VISIT (OUTPATIENT)
Dept: FAMILY MEDICINE CLINIC | Age: 27
End: 2022-04-11
Payer: COMMERCIAL

## 2022-04-11 VITALS
TEMPERATURE: 98.2 F | WEIGHT: 254 LBS | DIASTOLIC BLOOD PRESSURE: 78 MMHG | SYSTOLIC BLOOD PRESSURE: 128 MMHG | OXYGEN SATURATION: 96 % | BODY MASS INDEX: 37.62 KG/M2 | HEIGHT: 69 IN | HEART RATE: 122 BPM

## 2022-04-11 DIAGNOSIS — R05.9 COUGH: Primary | ICD-10-CM

## 2022-04-11 DIAGNOSIS — U07.1 COVID: ICD-10-CM

## 2022-04-11 LAB
Lab: ABNORMAL
PERFORMING INSTRUMENT: ABNORMAL
QC PASS/FAIL: ABNORMAL
SARS-COV-2, POC: DETECTED

## 2022-04-11 PROCEDURE — 99214 OFFICE O/P EST MOD 30 MIN: CPT | Performed by: NURSE PRACTITIONER

## 2022-04-11 PROCEDURE — 71046 X-RAY EXAM CHEST 2 VIEWS: CPT

## 2022-04-11 PROCEDURE — 87426 SARSCOV CORONAVIRUS AG IA: CPT | Performed by: NURSE PRACTITIONER

## 2022-04-11 RX ORDER — DEXAMETHASONE 0.5 MG/1
0.5 TABLET ORAL EVERY 6 HOURS
Qty: 40 TABLET | Refills: 0 | Status: SHIPPED | OUTPATIENT
Start: 2022-04-11 | End: 2022-04-21

## 2022-04-11 RX ORDER — GUAIFENESIN 600 MG/1
1200 TABLET, EXTENDED RELEASE ORAL 2 TIMES DAILY
Qty: 40 TABLET | Refills: 0 | Status: SHIPPED | OUTPATIENT
Start: 2022-04-11 | End: 2022-04-21

## 2022-04-11 RX ORDER — ACETAMINOPHEN 160 MG
1 TABLET,DISINTEGRATING ORAL DAILY
Qty: 90 CAPSULE | Refills: 3 | Status: SHIPPED | OUTPATIENT
Start: 2022-04-11

## 2022-04-11 SDOH — ECONOMIC STABILITY: FOOD INSECURITY: WITHIN THE PAST 12 MONTHS, YOU WORRIED THAT YOUR FOOD WOULD RUN OUT BEFORE YOU GOT MONEY TO BUY MORE.: NEVER TRUE

## 2022-04-11 SDOH — ECONOMIC STABILITY: FOOD INSECURITY: WITHIN THE PAST 12 MONTHS, THE FOOD YOU BOUGHT JUST DIDN'T LAST AND YOU DIDN'T HAVE MONEY TO GET MORE.: NEVER TRUE

## 2022-04-11 ASSESSMENT — PATIENT HEALTH QUESTIONNAIRE - PHQ9
5. POOR APPETITE OR OVEREATING: 3
SUM OF ALL RESPONSES TO PHQ QUESTIONS 1-9: 13
SUM OF ALL RESPONSES TO PHQ QUESTIONS 1-9: 13
10. IF YOU CHECKED OFF ANY PROBLEMS, HOW DIFFICULT HAVE THESE PROBLEMS MADE IT FOR YOU TO DO YOUR WORK, TAKE CARE OF THINGS AT HOME, OR GET ALONG WITH OTHER PEOPLE: 0
SUM OF ALL RESPONSES TO PHQ QUESTIONS 1-9: 13
2. FEELING DOWN, DEPRESSED OR HOPELESS: 3
SUM OF ALL RESPONSES TO PHQ QUESTIONS 1-9: 13
9. THOUGHTS THAT YOU WOULD BE BETTER OFF DEAD, OR OF HURTING YOURSELF: 0
3. TROUBLE FALLING OR STAYING ASLEEP: 1
8. MOVING OR SPEAKING SO SLOWLY THAT OTHER PEOPLE COULD HAVE NOTICED. OR THE OPPOSITE, BEING SO FIGETY OR RESTLESS THAT YOU HAVE BEEN MOVING AROUND A LOT MORE THAN USUAL: 0
SUM OF ALL RESPONSES TO PHQ9 QUESTIONS 1 & 2: 3
4. FEELING TIRED OR HAVING LITTLE ENERGY: 0
7. TROUBLE CONCENTRATING ON THINGS, SUCH AS READING THE NEWSPAPER OR WATCHING TELEVISION: 3
6. FEELING BAD ABOUT YOURSELF - OR THAT YOU ARE A FAILURE OR HAVE LET YOURSELF OR YOUR FAMILY DOWN: 3
1. LITTLE INTEREST OR PLEASURE IN DOING THINGS: 0

## 2022-04-11 ASSESSMENT — ENCOUNTER SYMPTOMS
ALLERGIC/IMMUNOLOGIC NEGATIVE: 1
EYES NEGATIVE: 1
SHORTNESS OF BREATH: 1
GASTROINTESTINAL NEGATIVE: 1
SORE THROAT: 1

## 2022-04-11 ASSESSMENT — SOCIAL DETERMINANTS OF HEALTH (SDOH): HOW HARD IS IT FOR YOU TO PAY FOR THE VERY BASICS LIKE FOOD, HOUSING, MEDICAL CARE, AND HEATING?: NOT VERY HARD

## 2022-04-11 NOTE — PROGRESS NOTES
No Food Insecurity    Worried About Running Out of Food in the Last Year: Never true    Vilma of Food in the Last Year: Never true   Transportation Needs:     Lack of Transportation (Medical): Not on file    Lack of Transportation (Non-Medical):  Not on file   Physical Activity:     Days of Exercise per Week: Not on file    Minutes of Exercise per Session: Not on file   Stress:     Feeling of Stress : Not on file   Social Connections:     Frequency of Communication with Friends and Family: Not on file    Frequency of Social Gatherings with Friends and Family: Not on file    Attends Congregational Services: Not on file    Active Member of [address] [place] [address] [email or web address] or Organizations: Not on file    Attends Club or Organization Meetings: Not on file    Marital Status: Not on file   Intimate Partner Violence:     Fear of Current or Ex-Partner: Not on file    Emotionally Abused: Not on file    Physically Abused: Not on file    Sexually Abused: Not on file   Housing Stability:     Unable to Pay for Housing in the Last Year: Not on file    Number of [place] in the Last Year: Not on file    Unstable Housing in the Last Year: Not on file     Family History   Problem Relation Age of Onset    Other Mother         thyroid    Cancer Mother      Allergies   Allergen Reactions    Penicillins Other (See Comments)     Current Outpatient Medications   Medication Sig Dispense Refill    molnupiravir 200 MG capsule Take 4 capsules by mouth every 12 hours for 5 days 40 capsule 0    dexamethasone (DECADRON) 0.5 MG tablet Take 1 tablet by mouth every 6 hours for 10 days 40 tablet 0    guaiFENesin (MUCINEX) 600 MG extended release tablet Take 2 tablets by mouth 2 times daily for 10 days 40 tablet 0    Cholecalciferol (VITAMIN D3) 50 MCG (2000 UT) CAPS Take 1 capsule by mouth daily 90 capsule 3    vitamin D (ERGOCALCIFEROL) 1.25 MG (06817 UT) CAPS capsule Take 1 capsule by mouth once a week 4 capsule 5    Multiple Vitamin (ONE-A-DAY MENS PO) Take by mouth      amphetamine-dextroamphetamine (ADDERALL XR) 10 MG extended release capsule Take 1 capsule by mouth daily for 30 days. (Patient not taking: Reported on 4/11/2022) 30 capsule 0    amphetamine-dextroamphetamine (ADDERALL XR) 10 MG extended release capsule Take 1 capsule by mouth daily for 30 days. (Patient not taking: Reported on 4/11/2022) 30 capsule 0    amphetamine-dextroamphetamine (ADDERALL XR) 10 MG extended release capsule Take 1 capsule by mouth daily for 30 days. (Patient not taking: Reported on 4/11/2022) 30 capsule 0     No current facility-administered medications for this visit. Review of Systems   Constitutional: Positive for activity change, appetite change, chills, fatigue and fever. HENT: Positive for congestion and sore throat. Eyes: Negative. Respiratory: Positive for shortness of breath. Cardiovascular: Negative. Gastrointestinal: Negative. Endocrine: Negative. Genitourinary: Negative. Musculoskeletal: Positive for myalgias. Allergic/Immunologic: Negative. Neurological: Negative. Hematological: Negative. Psychiatric/Behavioral: Negative. Objective:   /78 (Site: Right Upper Arm, Position: Sitting, Cuff Size: Medium Adult)   Pulse 122   Temp 98.2 °F (36.8 °C) (Temporal)   Ht 5' 9\" (1.753 m)   Wt 254 lb (115.2 kg)   SpO2 96%   BMI 37.51 kg/m²     Physical Exam  Constitutional:       General: He is not in acute distress. Appearance: He is well-developed. HENT:      Head: Normocephalic and atraumatic. Nose: Congestion present. Mouth/Throat:      Pharynx: Oropharynx is clear. Eyes:      Pupils: Pupils are equal, round, and reactive to light. Cardiovascular:      Rate and Rhythm: Regular rhythm. Tachycardia present. Pulses: Normal pulses. Heart sounds: Normal heart sounds. Pulmonary:      Effort: Pulmonary effort is normal.      Breath sounds: Normal breath sounds.    Abdominal: General: Bowel sounds are normal.      Palpations: Abdomen is soft. Musculoskeletal:         General: Normal range of motion. Cervical back: Normal range of motion and neck supple. Skin:     General: Skin is warm and dry. Capillary Refill: Capillary refill takes less than 2 seconds. Neurological:      General: No focal deficit present. Mental Status: He is alert and oriented to person, place, and time. Psychiatric:         Behavior: Behavior normal.         Assessment:       Diagnosis Orders   1. Cough  POCT COVID-19, Antigen   2. COVID  DME Order for Pulse Ox as OP    XR CHEST STANDARD (2 VW)     No results found for this visit on 04/11/22. Plan:     Assessment & Plan   Bea Ayers was seen today for fever and cough. Diagnoses and all orders for this visit:    Cough  -     POCT COVID-19, Antigen    COVID  -     DME Order for Pulse Ox as OP  -     XR CHEST STANDARD (2 VW); Future    Other orders  -     molnupiravir 200 MG capsule; Take 4 capsules by mouth every 12 hours for 5 days  -     dexamethasone (DECADRON) 0.5 MG tablet; Take 1 tablet by mouth every 6 hours for 10 days  -     guaiFENesin (MUCINEX) 600 MG extended release tablet; Take 2 tablets by mouth 2 times daily for 10 days  -     Cholecalciferol (VITAMIN D3) 50 MCG (2000 UT) CAPS; Take 1 capsule by mouth daily      Orders Placed This Encounter   Procedures    XR CHEST STANDARD (2 VW)     Standing Status:   Future     Number of Occurrences:   1     Standing Expiration Date:   4/11/2023     Order Specific Question:   Reason for exam:     Answer:   shortness of breath    POCT COVID-19, Antigen     Order Specific Question:   Is this test for diagnosis or screening? Answer:   Diagnosis of ill patient     Order Specific Question:   Symptomatic for COVID-19 as defined by CDC? Answer:   Yes     Order Specific Question:   Date of Symptom Onset     Answer:   4/8/2022     Order Specific Question:   Hospitalized for COVID-19? Answer:   No     Order Specific Question:   Admitted to ICU for COVID-19? Answer:   No     Order Specific Question:   Employed in healthcare setting? Answer:   Unknown     Order Specific Question:   Resident in a congregate (group) care setting? Answer:   Unknown     Order Specific Question:   Pregnant: Answer:   No     Order Specific Question:   Previously tested for COVID-19? Answer:   Unknown    DME Order for Pulse Ox as OP     - Pulse Oximeter Device  - Frequency - 6 times per day  - Notify provider if oxygen saturation less than 88%  - Diagnosis - Covid  - Length of need (months) - 3 Months     Orders Placed This Encounter   Medications    molnupiravir 200 MG capsule     Sig: Take 4 capsules by mouth every 12 hours for 5 days     Dispense:  40 capsule     Refill:  0     Order Specific Question:   Does this patient qualify for COVID-19 antIviral therapy based on criteria for treatment? Answer: Yes    dexamethasone (DECADRON) 0.5 MG tablet     Sig: Take 1 tablet by mouth every 6 hours for 10 days     Dispense:  40 tablet     Refill:  0    guaiFENesin (MUCINEX) 600 MG extended release tablet     Sig: Take 2 tablets by mouth 2 times daily for 10 days     Dispense:  40 tablet     Refill:  0    Cholecalciferol (VITAMIN D3) 50 MCG (2000 UT) CAPS     Sig: Take 1 capsule by mouth daily     Dispense:  90 capsule     Refill:  3     There are no discontinued medications. Return if symptoms worsen or fail to improve, for follow up with PCP. Reviewed with the patient/family: current clinical status & medications. Side effects of the medication prescribed today, as well as treatment plan/rationale and result expectations have been discussed with the patient/family who expresses understanding. Patient will be discharged home in stable condition. Follow up with PCP to evaluate treatment results or return if symptoms worsen or fail to improve.  Discussed signs and symptoms which require immediate follow-up in ED/call to 911. Understanding verbalized. I have reviewed the patient's medical history in detail and updated the computerized patient record.     Graeme Shaffer, APRN - CNP

## 2022-04-11 NOTE — PATIENT INSTRUCTIONS
Patient Education        Learning About COVID-19 and Flu Symptoms  How can you tell COVID-19 from the flu? COVID-19 and the flu have similar symptoms. The two can be hard to tell apart. The only way to know for sure which illness you have is to be tested. If you have questions about COVID-19 testing, ask your doctor or go to cdc.gov to usethe COVID-19 Viral Testing Tool. Since the symptoms are so alike, it makes sense to act as if you have COVID-19 until your test results come back. This means staying home and limiting contact with people in your home. You'll need to wash your hands often and disinfect surfaces that you touch. And be sure to wear a mask when you're around otherpeople. This is also good advice if you think you have the flu. COVID-19 and the flu have these symptoms in common:   Fever or chills   Cough   Shortness of breath   Fatigue (tiredness)   Sore throat   Runny or stuffy nose   Muscle and body aches   Headache   Vomiting and diarrhea (more common in children than adults)  COVID-19 has another symptom that also may occur:   New loss of taste or smell  COVID-19 symptoms may appear from 2 to 14 days after infection. Flu symptoms usually appear 1 to 4 days after infection. Why should you get a flu shot during the COVID-19 pandemic? It's important to get your yearly flu vaccine. Both the flu and COVID-19 can be active at the same time. You can get sick with both infections at once. Andhaving both may make you more sick than getting just one. The flu vaccine won't protect you from COVID-19. But it can help prevent the flu or reduce its symptoms. If fewer people get very ill with the flu, thiswill help free up medical resources that are needed for COVID-19 patients. Where can you learn more? Go to [email or web address] and sign in to your [employer] account.  Enter C123 in the [employer] box to learn more about \"Learning About COVID-19 and Flu Symptoms. \"     If you do not have an account, please click on the \"Sign Up Now\" link. Current as of: March 26, 2021               Content Version: 13.2  © 4318-6213 [employer]. Care instructions adapted under license by [facility] ([facility]). If you have questions about a medical condition or this instruction, always ask your healthcare professional. [place] [address] any warranty or liability for your use of this information.

## 2023-03-10 ENCOUNTER — OFFICE VISIT (OUTPATIENT)
Dept: FAMILY MEDICINE CLINIC | Age: 28
End: 2023-03-10
Payer: COMMERCIAL

## 2023-03-10 VITALS
HEART RATE: 71 BPM | WEIGHT: 286 LBS | HEIGHT: 69 IN | BODY MASS INDEX: 42.36 KG/M2 | SYSTOLIC BLOOD PRESSURE: 128 MMHG | DIASTOLIC BLOOD PRESSURE: 76 MMHG | TEMPERATURE: 98.1 F | OXYGEN SATURATION: 99 %

## 2023-03-10 DIAGNOSIS — F33.1 MODERATE EPISODE OF RECURRENT MAJOR DEPRESSIVE DISORDER (HCC): ICD-10-CM

## 2023-03-10 DIAGNOSIS — M67.40 GANGLION CYST: Primary | ICD-10-CM

## 2023-03-10 DIAGNOSIS — E66.01 OBESITY, CLASS III, BMI 40-49.9 (MORBID OBESITY) (HCC): ICD-10-CM

## 2023-03-10 PROCEDURE — 99213 OFFICE O/P EST LOW 20 MIN: CPT | Performed by: FAMILY MEDICINE

## 2023-03-10 SDOH — ECONOMIC STABILITY: INCOME INSECURITY: HOW HARD IS IT FOR YOU TO PAY FOR THE VERY BASICS LIKE FOOD, HOUSING, MEDICAL CARE, AND HEATING?: NOT HARD AT ALL

## 2023-03-10 SDOH — ECONOMIC STABILITY: FOOD INSECURITY: WITHIN THE PAST 12 MONTHS, YOU WORRIED THAT YOUR FOOD WOULD RUN OUT BEFORE YOU GOT MONEY TO BUY MORE.: NEVER TRUE

## 2023-03-10 SDOH — ECONOMIC STABILITY: HOUSING INSECURITY
IN THE LAST 12 MONTHS, WAS THERE A TIME WHEN YOU DID NOT HAVE A STEADY PLACE TO SLEEP OR SLEPT IN A SHELTER (INCLUDING NOW)?: NO

## 2023-03-10 SDOH — ECONOMIC STABILITY: FOOD INSECURITY: WITHIN THE PAST 12 MONTHS, THE FOOD YOU BOUGHT JUST DIDN'T LAST AND YOU DIDN'T HAVE MONEY TO GET MORE.: NEVER TRUE

## 2023-03-10 ASSESSMENT — PATIENT HEALTH QUESTIONNAIRE - PHQ9
9. THOUGHTS THAT YOU WOULD BE BETTER OFF DEAD, OR OF HURTING YOURSELF: 0
3. TROUBLE FALLING OR STAYING ASLEEP: 0
SUM OF ALL RESPONSES TO PHQ QUESTIONS 1-9: 0
7. TROUBLE CONCENTRATING ON THINGS, SUCH AS READING THE NEWSPAPER OR WATCHING TELEVISION: 0
4. FEELING TIRED OR HAVING LITTLE ENERGY: 0
SUM OF ALL RESPONSES TO PHQ QUESTIONS 1-9: 0
SUM OF ALL RESPONSES TO PHQ QUESTIONS 1-9: 0
5. POOR APPETITE OR OVEREATING: 0
8. MOVING OR SPEAKING SO SLOWLY THAT OTHER PEOPLE COULD HAVE NOTICED. OR THE OPPOSITE, BEING SO FIGETY OR RESTLESS THAT YOU HAVE BEEN MOVING AROUND A LOT MORE THAN USUAL: 0
6. FEELING BAD ABOUT YOURSELF - OR THAT YOU ARE A FAILURE OR HAVE LET YOURSELF OR YOUR FAMILY DOWN: 0
SUM OF ALL RESPONSES TO PHQ9 QUESTIONS 1 & 2: 0
1. LITTLE INTEREST OR PLEASURE IN DOING THINGS: 0
SUM OF ALL RESPONSES TO PHQ QUESTIONS 1-9: 0
2. FEELING DOWN, DEPRESSED OR HOPELESS: 0

## 2023-03-10 ASSESSMENT — ENCOUNTER SYMPTOMS
SHORTNESS OF BREATH: 0
GASTROINTESTINAL NEGATIVE: 1
EYES NEGATIVE: 1
RESPIRATORY NEGATIVE: 1
ALLERGIC/IMMUNOLOGIC NEGATIVE: 1

## 2023-03-10 NOTE — PROGRESS NOTES
Patient is seen in follow up for   Chief Complaint   Patient presents with    Mass     Right wrist been there a month     Mass  Pertinent negatives include no chest pain, chills or fever. swelling right wrist for a month no trauma. Past Medical History:   Diagnosis Date    Depression      Patient Active Problem List    Diagnosis Date Noted    IRENA (generalized anxiety disorder) 06/18/2021    ADHD (attention deficit hyperactivity disorder), inattentive type 06/18/2021    Vitamin D deficiency 01/04/2018    Recurrent depression ([facility] [address] 12/11/2017    Anxiety 12/11/2017     No past surgical history on file. Family History   Problem Relation Age of Onset    Other Mother         thyroid    Cancer Mother      Social History     Socioeconomic History    Marital status:    Tobacco Use    Smoking status: Never    Smokeless tobacco: Never   Substance and Sexual Activity    Alcohol use: Yes     Comment: occ. Drug use: No    Sexual activity: Yes     Social Determinants of Health     Financial Resource Strain: Low Risk     Difficulty of Paying Living Expenses: Not hard at all   Food Insecurity: No Food Insecurity    Worried About [address] in the Last Year: Never true    [address] [employer] in the Last Year: Never true   Transportation Needs: Unknown    Lack of Transportation (Non-Medical): No   Housing Stability: Unknown    Unstable Housing in the Last Year: No     Current Outpatient Medications   Medication Sig Dispense Refill    Cholecalciferol (VITAMIN D3) 50 MCG (2000 UT) CAPS Take 1 capsule by mouth daily 90 capsule 3    vitamin D (ERGOCALCIFEROL) 1.25 MG (67101 UT) CAPS capsule Take 1 capsule by mouth once a week 4 capsule 5    Multiple Vitamin (ONE-A-DAY MENS PO) Take by mouth       No current facility-administered medications for this visit.      Current Outpatient Medications on File Prior to Visit   Medication Sig Dispense Refill    Cholecalciferol (VITAMIN D3) 50 MCG (2000 UT) CAPS Take 1 capsule by mouth daily 90 capsule 3    vitamin D (ERGOCALCIFEROL) 1.25 MG (70850 UT) CAPS capsule Take 1 capsule by mouth once a week 4 capsule 5    Multiple Vitamin (ONE-A-DAY MENS PO) Take by mouth       No current facility-administered medications on file prior to visit. Allergies   Allergen Reactions    Penicillins Other (See Comments)     Health Maintenance   Topic Date Due    Varicella vaccine (2 of 2 - 13+ 2-dose series) 09/13/2010    HIV screen  Never done    Hepatitis A vaccine (2 of 2 - 2-dose series) 02/16/2011    Hepatitis C screen  Never done    COVID-19 Vaccine (3 - Booster for Pfizer series) 07/25/2021    Flu vaccine (1) 08/01/2022    Depression Monitoring  04/11/2023    DTaP/Tdap/Td vaccine (3 - Td or Tdap) 08/06/2028    Hib vaccine  Aged Out    Meningococcal (ACWY) vaccine  Aged Out    Pneumococcal 0-64 years Vaccine  Aged Out       Review of Systems     Review of Systems   Constitutional:  Negative for activity change, appetite change, chills, fever and unexpected weight change. HENT: Negative. Eyes: Negative. Respiratory: Negative. Negative for shortness of breath. Cardiovascular: Negative. Negative for chest pain and palpitations. Gastrointestinal: Negative. Endocrine: Negative. Genitourinary: Negative. Musculoskeletal: Negative. Skin: Negative. Allergic/Immunologic: Negative. Neurological: Negative. Hematological: Negative. Psychiatric/Behavioral: Negative. Physical Exam  Vitals:    03/10/23 0809   BP: 128/76   Pulse: 71   Temp: 98.1 °F (36.7 °C)   SpO2: 99%   Weight: 286 lb (129.7 kg)   Height: 5' 9\" (1.753 m)       Physical Exam  Constitutional:       Appearance: He is well-developed. HENT:      Right Ear: External ear normal.      Left Ear: External ear normal.   Eyes:      Conjunctiva/sclera: Conjunctivae normal.      Pupils: Pupils are equal, round, and reactive to light. Neck:      Thyroid: No thyromegaly.    Cardiovascular:      Rate and Rhythm: Normal rate and regular rhythm. Heart sounds: Normal heart sounds. No murmur heard. No friction rub. No gallop. Pulmonary:      Effort: Pulmonary effort is normal. No respiratory distress. Breath sounds: Normal breath sounds. No wheezing. Abdominal:      General: Bowel sounds are normal. There is no distension. Palpations: Abdomen is soft. There is no mass. Tenderness: There is no abdominal tenderness. There is no guarding or rebound. Hernia: No hernia is present. Genitourinary:     Penis: Normal.    Musculoskeletal:         General: No tenderness. Normal range of motion. Arms:       Cervical back: Normal range of motion and neck supple. Lymphadenopathy:      Cervical: No cervical adenopathy. Skin:     General: Skin is warm and dry. Neurological:      Mental Status: He is alert and oriented to person, place, and time. Cranial Nerves: No cranial nerve deficit. Coordination: Coordination normal.       Assessment   Diagnosis Orders   1. Ganglion cyst        2. Moderate episode of recurrent major depressive disorder [facility])  Ambulatory referral to Psychology      3. Obesity, Class III, BMI 40-49.9 (morbid obesity) ([facility] [address]          Problem List    None    Plan  Orders Placed This Encounter   Procedures    Ambulatory referral to Psychology     Referral Priority:   Routine     Referral Type:   Psychiatric     Referral Reason:   Specialty Services Required     Referred to Provider:   Tung Gimenez PSYD     Requested Specialty:   Psychology     Number of Visits Requested:   1     No orders of the defined types were placed in this encounter. Return if symptoms worsen or fail to improve.   Ana Muse MD

## 2023-04-10 ENCOUNTER — TELEPHONE (OUTPATIENT)
Dept: ORTHOPEDIC SURGERY | Age: 28
End: 2023-04-10

## 2023-04-28 ENCOUNTER — TELEMEDICINE (OUTPATIENT)
Dept: BEHAVIORAL/MENTAL HEALTH CLINIC | Age: 28
End: 2023-04-28
Payer: COMMERCIAL

## 2023-04-28 DIAGNOSIS — F34.1 PERSISTENT DEPRESSIVE DISORDER: Primary | ICD-10-CM

## 2023-04-28 DIAGNOSIS — F41.1 GAD (GENERALIZED ANXIETY DISORDER): ICD-10-CM

## 2023-04-28 PROCEDURE — 90832 PSYTX W PT 30 MINUTES: CPT | Performed by: PSYCHOLOGIST

## 2023-04-28 ASSESSMENT — PATIENT HEALTH QUESTIONNAIRE - PHQ9
SUM OF ALL RESPONSES TO PHQ QUESTIONS 1-9: 18
6. FEELING BAD ABOUT YOURSELF - OR THAT YOU ARE A FAILURE OR HAVE LET YOURSELF OR YOUR FAMILY DOWN: 3
2. FEELING DOWN, DEPRESSED OR HOPELESS: 2
1. LITTLE INTEREST OR PLEASURE IN DOING THINGS: 2
5. POOR APPETITE OR OVEREATING: 3
SUM OF ALL RESPONSES TO PHQ QUESTIONS 1-9: 17
7. TROUBLE CONCENTRATING ON THINGS, SUCH AS READING THE NEWSPAPER OR WATCHING TELEVISION: 3
SUM OF ALL RESPONSES TO PHQ9 QUESTIONS 1 & 2: 4
9. THOUGHTS THAT YOU WOULD BE BETTER OFF DEAD, OR OF HURTING YOURSELF: 1
3. TROUBLE FALLING OR STAYING ASLEEP: 1
SUM OF ALL RESPONSES TO PHQ QUESTIONS 1-9: 18
4. FEELING TIRED OR HAVING LITTLE ENERGY: 3
SUM OF ALL RESPONSES TO PHQ QUESTIONS 1-9: 18
8. MOVING OR SPEAKING SO SLOWLY THAT OTHER PEOPLE COULD HAVE NOTICED. OR THE OPPOSITE, BEING SO FIGETY OR RESTLESS THAT YOU HAVE BEEN MOVING AROUND A LOT MORE THAN USUAL: 0

## 2023-04-28 ASSESSMENT — ANXIETY QUESTIONNAIRES
4. TROUBLE RELAXING: 2
1. FEELING NERVOUS, ANXIOUS, OR ON EDGE: 3
3. WORRYING TOO MUCH ABOUT DIFFERENT THINGS: 3
5. BEING SO RESTLESS THAT IT IS HARD TO SIT STILL: 3
GAD7 TOTAL SCORE: 18
2. NOT BEING ABLE TO STOP OR CONTROL WORRYING: 3
7. FEELING AFRAID AS IF SOMETHING AWFUL MIGHT HAPPEN: 2
6. BECOMING EASILY ANNOYED OR IRRITABLE: 2

## 2023-05-08 NOTE — PATIENT INSTRUCTIONS
want to be right now -- calm, motivated, and confident are among the options -- then let the audio hypnosis guide you through a session. Relax Melodies  Platform: iPhone and Android  Cost: Free  Anxiety can disrupt healthy sleep patterns in more than one way. First, people who dont get enough sleep tend to feel more anxious. Then, people who are more anxious have a difficult time sleeping. Creating a calming environment may help you fall asleep and stay asleep. Relax to one of this johns 50 sounds. Need the music to stop once youre asleep? Set a timer, and it will stop playing. Set an alarm when you need to be awake. Then, enjoy the benefits of a good nights sleep, free from anxiety. Relaxing Sounds of Nature - Lite  Platform: iPhone  Cost: Free  You can find rest and relaxation without having to travel. The john comes with 35 nature tracks, which include soothing classics like crickets chirping, breaking waves, and a serene lake. You can download more free tracks to Click With Me Now, and customize a favorite combination that helps you reduce your anxiety in a peaceful setting. Allow the sounds of nature to sweep you away from your worries in the comfort of your living room, office, or bedroom. Relax & Rest Guided Meditations  Platform: iPhone and Android  Cost: $0.99  While group meetings and discussions are always an option, some people find relaxation more easily on their own. This john lets you relax in the space of your own home or office with three guided meditations. Breath Awareness Guided Meditation (5 minutes), Deep Rested Guided Meditation (13 minutes), and Whole Body Guided Relaxation (24 minutes) are designed specifically to help you relax and sink into a peaceful meditation moment.     Virtual Hope Box  Platform: [email or web address]-labshone and Android  Cost: Free  The Automatic Data Box (VHB) is a smartphone application that contains simple tools to help with coping, relaxation, distraction, and positive thinking

## 2025-03-19 PROBLEM — Z20.822 SUSPECTED COVID-19 VIRUS INFECTION: Status: ACTIVE | Noted: 2025-03-19

## 2025-03-19 PROBLEM — R05.9 COUGH: Status: ACTIVE | Noted: 2025-03-19

## 2025-03-20 ENCOUNTER — APPOINTMENT (OUTPATIENT)
Dept: PRIMARY CARE | Facility: CLINIC | Age: 30
End: 2025-03-20
Payer: COMMERCIAL

## 2025-03-20 VITALS
BODY MASS INDEX: 45.23 KG/M2 | WEIGHT: 305.4 LBS | TEMPERATURE: 98.4 F | SYSTOLIC BLOOD PRESSURE: 128 MMHG | HEIGHT: 69 IN | HEART RATE: 88 BPM | DIASTOLIC BLOOD PRESSURE: 61 MMHG

## 2025-03-20 DIAGNOSIS — Z00.00 WELL ADULT EXAM: ICD-10-CM

## 2025-03-20 DIAGNOSIS — R20.2 PARESTHESIA OF RIGHT ARM: ICD-10-CM

## 2025-03-20 DIAGNOSIS — E55.9 VITAMIN D DEFICIENCY: ICD-10-CM

## 2025-03-20 DIAGNOSIS — F33.9 RECURRENT DEPRESSION (CMS-HCC): Primary | ICD-10-CM

## 2025-03-20 PROBLEM — Z20.822 SUSPECTED COVID-19 VIRUS INFECTION: Status: RESOLVED | Noted: 2025-03-19 | Resolved: 2025-03-20

## 2025-03-20 PROBLEM — R05.9 COUGH: Status: RESOLVED | Noted: 2025-03-19 | Resolved: 2025-03-20

## 2025-03-20 PROBLEM — F90.0 ADHD (ATTENTION DEFICIT HYPERACTIVITY DISORDER), INATTENTIVE TYPE: Status: ACTIVE | Noted: 2021-06-18

## 2025-03-20 PROBLEM — F90.9 ATTENTION DEFICIT DISORDER OF ADULT WITH HYPERACTIVITY: Status: ACTIVE | Noted: 2025-03-20

## 2025-03-20 PROBLEM — F90.0 ADHD (ATTENTION DEFICIT HYPERACTIVITY DISORDER), INATTENTIVE TYPE: Status: RESOLVED | Noted: 2021-06-18 | Resolved: 2025-03-20

## 2025-03-20 PROBLEM — F41.1 GAD (GENERALIZED ANXIETY DISORDER): Status: ACTIVE | Noted: 2021-06-18

## 2025-03-20 PROBLEM — F33.0 MILD EPISODE OF RECURRENT MAJOR DEPRESSIVE DISORDER (CMS-HCC): Status: ACTIVE | Noted: 2017-12-11

## 2025-03-20 PROBLEM — F41.9 ANXIETY: Status: ACTIVE | Noted: 2017-12-11

## 2025-03-20 PROCEDURE — 3008F BODY MASS INDEX DOCD: CPT | Performed by: FAMILY MEDICINE

## 2025-03-20 PROCEDURE — 99204 OFFICE O/P NEW MOD 45 MIN: CPT | Performed by: FAMILY MEDICINE

## 2025-03-20 RX ORDER — ESCITALOPRAM OXALATE 10 MG/1
10 TABLET ORAL DAILY
Qty: 30 TABLET | Refills: 0 | Status: SHIPPED | OUTPATIENT
Start: 2025-03-20

## 2025-03-20 ASSESSMENT — LIFESTYLE VARIABLES
SKIP TO QUESTIONS 9-10: 0
HOW MANY STANDARD DRINKS CONTAINING ALCOHOL DO YOU HAVE ON A TYPICAL DAY: 3 OR 4
HOW OFTEN DO YOU HAVE A DRINK CONTAINING ALCOHOL: 2-4 TIMES A MONTH
AUDIT-C TOTAL SCORE: 4
HOW OFTEN DO YOU HAVE SIX OR MORE DRINKS ON ONE OCCASION: LESS THAN MONTHLY

## 2025-03-20 ASSESSMENT — PATIENT HEALTH QUESTIONNAIRE - PHQ9
8. MOVING OR SPEAKING SO SLOWLY THAT OTHER PEOPLE COULD HAVE NOTICED. OR THE OPPOSITE, BEING SO FIGETY OR RESTLESS THAT YOU HAVE BEEN MOVING AROUND A LOT MORE THAN USUAL: SEVERAL DAYS
1. LITTLE INTEREST OR PLEASURE IN DOING THINGS: SEVERAL DAYS
9. THOUGHTS THAT YOU WOULD BE BETTER OFF DEAD, OR OF HURTING YOURSELF: NOT AT ALL
SUM OF ALL RESPONSES TO PHQ QUESTIONS 1-9: 20
4. FEELING TIRED OR HAVING LITTLE ENERGY: NEARLY EVERY DAY
SUM OF ALL RESPONSES TO PHQ9 QUESTIONS 1 AND 2: 4
6. FEELING BAD ABOUT YOURSELF - OR THAT YOU ARE A FAILURE OR HAVE LET YOURSELF OR YOUR FAMILY DOWN: NEARLY EVERY DAY
5. POOR APPETITE OR OVEREATING: NEARLY EVERY DAY
2. FEELING DOWN, DEPRESSED OR HOPELESS: NEARLY EVERY DAY
3. TROUBLE FALLING OR STAYING ASLEEP OR SLEEPING TOO MUCH: NEARLY EVERY DAY
10. IF YOU CHECKED OFF ANY PROBLEMS, HOW DIFFICULT HAVE THESE PROBLEMS MADE IT FOR YOU TO DO YOUR WORK, TAKE CARE OF THINGS AT HOME, OR GET ALONG WITH OTHER PEOPLE: EXTREMELY DIFFICULT
7. TROUBLE CONCENTRATING ON THINGS, SUCH AS READING THE NEWSPAPER OR WATCHING TELEVISION: NEARLY EVERY DAY

## 2025-03-20 NOTE — PATIENT INSTRUCTIONS
Follow up in 4 weeks.    It was a pleasure to see you today. Thank you for choosing us for your health care needs.    If you have lab or other testing completed and have not been informed of results within one week, please call the office for your results.    If you haven't done so, consider signing up for [facility]Vis[facility]art, the [facility] personal health record. Ask the staff how you can get started.

## 2025-03-20 NOTE — PROGRESS NOTES
"Subjective   Patient ID: Giles Moscoso is a 29 y.o. male who presents for New Patient Visit.    [facility]       Establish care     PCP previously Dr. Medardo Lazo.     Patient would like to discuss medication for depression.   He has been treated with Prozac in the past.   Patient stopped taking as it make him feel like a \"zombie\"  Still has deperssed moods but denies any SI.    Patient states that his right arm and hand sometimes goes numb onset 5 years ago.  He states he notices this at night and while at work.  He states he does repetitive motions at work and also uses vibratory tools.  Vitamin B12 and Folate were normal on 5/12/2021 labs.  Patient works at [employer] in [email or web address] since 2020.      Patient has ADHD.  He was on Adderall in the past but it did not work well for him.   Currently not taking any medication.     Patient has vitamin D deficiency.   Currently not taking a vitamin D supplement.    Patient had asthma in the past.   He states he had more exacerbations as a child.   Denies any recent flare-ups.    Review of Systems  Constitutional: Patient denies any fever, chills, loss of appetite, or unexplained weight loss.  HEENT: Denies any headache, sore throat, eye pain, ear pain, decreased vision, or decreased hearing. Patient also denies any rhinorrhea.  Cardiovascular: Patient denies any chest pain, shortness of breath with exertion, tachycardia, palpitations, orthopnea, or paroxysmal nocturnal dyspnea.  Respiratory: Patient denies any cough, shortness of breath, or wheezing.  Gastrointestinal: Patient denies any nausea, vomiting, diarrhea, constipation, melena, hematochezia, or reflux symptoms  Musculoskeletal: Patient denies any myalgia, arthralgia, joint swelling, or joint deformity  Skin: Denies any rashes or skin lesions    Neurology: Patient denies any new motor or sensory losses. Denies any tingling, weakness, and incoordination of the extremities. Patient also denies any tremor, seizures, or gait " Emergency Department Trauma Note  Jimmy Mello 65 y.o. male MRN: [id number]  Unit/Bed#: ED OVR 04/OVR 04 Encounter: [id number]      Trauma Alert: Trauma Acuity: Trauma Evaluation  Model of Arrival: Mode of Arrival: Direct from scene via    Trauma Team: Current Providers  Attending Provider: Ronaldo Urena DO  Registered Nurse: Moustapha Keyes RN  Consultants:     None      History of Present Illness     Chief Complaint:   Chief Complaint   Patient presents with    Fall     Pt was at work and tripped over leg falling and hit head on concrete ground, denies loss of consciousness, no vomitting, takes aspirin daily     HPI:  Jimmy Mello is a 65 y.o. male who presents with family by private vehicle after mechanical fall.  Patient states that approximately 1.5hours prior to arrival, he slipped, fell and hit the right side of his head on the floor.  He denies loss of consciousness.  Patient is on a ASA 325mg daily  Mechanism:             Fall  Associated symptoms: no abdominal pain, no back pain, no chest pain, no nausea and no vomiting      Review of Systems   Constitutional:  Negative for chills and fever.   Respiratory:  Negative for cough, shortness of breath and wheezing.    Cardiovascular:  Negative for chest pain and palpitations.   Gastrointestinal:  Negative for abdominal pain, constipation, diarrhea, nausea and vomiting.   Genitourinary:  Negative for dysuria, flank pain, hematuria and urgency.   Musculoskeletal:  Negative for back pain.   Skin:  Negative for color change and rash.   Psychiatric/Behavioral:  The patient is nervous/anxious.    All other systems reviewed and are negative.      Historical Information     Immunizations:   Immunization History   Administered Date(s) Administered    COVID-19 MODERNA VACC 0.5 ML IM 01/21/2021, 03/01/2021, 11/29/2021    Tdap 06/19/2024    Zoster Vaccine Recombinant 07/11/2024, 12/23/2024       Past Medical History:   Diagnosis Date    Adjustment disorder with  "instability.  Numbness in right arm/hand.    Endocrinology: Denies any polyuria, polydipsia, polyphagia, or heat/cold intolerance.    Psychiatric: Denies any anxiety, or suicidal/homicidal ideation.  Positive for depression.     Hematology: Patient denies any abnormal bruising or bleeding.    SEE HPI ALSO     Objective   /61 (BP Location: Right arm, Patient Position: Sitting, BP Cuff Size: Adult long)   Pulse 88   Temp 36.9 °C (98.4 °F) (Temporal)   Ht 1.753 m (5' 9\")   Wt 139 kg (305 lb 6.4 oz)   BMI 45.10 kg/m²     Physical Exam  Gen. Appearance: Alert and cooperative, no acute distress, well-developed/well-nourished overweight male.  Head: Normocephalic and atraumatic  EENT: Pupils are equal round reactive to light, extraocular muscles are intact, mucous membranes are moist, external auditory canals and tympanic membranes are within normal limits bilaterally, pharynx is without erythema or exudate, there is no noted rhinorrhea.  Neck: Supple and without adenopathy, no JVD at 90° and no carotid bruits are noted. There is no thyromegaly, thyroid tenderness, or palpable thyroid nodules.  Cardiovascular: Regular rate and rhythm without murmur or ectopy.  Respiratory: Clear to auscultation bilaterally with good air exchange.  Abdomen: Soft, nontender/nondistended. No masses, guarding, rebound, or rigidity. No hepatosplenomegaly, abdominal bruits, or CVA tenderness. Bowel sounds are normal. There is no widening of the aortic pulsation.  Musculoskeletal: Patient has good range of motion of the shoulders, elbows, wrists, hips, knees. There are no noted joint effusions or deformities.  Skin: Good turgor, moist, warm and without rashes or lesions.  Lymph nodes: No cervical, clavicular, or inguinal adenopathy.  Neurological exam: Alert and oriented ×3, no tremor, normal gait.  Psychiatric: Appropriate mood and affect, good insight and judgment, no delusions or thought disorders, no suicidal or homicidal " depressed mood 2023    BRAULIO (acute kidney injury) (HCC)     B12 deficiency     Celiac artery stenosis (HCC)     Cerebellar infarct (HCC) 2023    CKD (chronic kidney disease) stage 2, GFR 60-89 ml/min     Essential hypertension     Impaired fasting glucose     Iron deficiency anemia     Neurogenic bowel     Stenosis of left vertebral artery     Stroke (HCC)     Vertebral artery dissection (HCC)        Family History   Problem Relation Age of Onset    Hypertension Brother     Hypertension Brother      Past Surgical History:   Procedure Laterality Date    ARTERY SURGERY      vertebral artery stent/revascularization    IR STROKE ALERT  2023     Social History     Tobacco Use    Smoking status: Former     Current packs/day: 0.00     Average packs/day: 1 pack/day for 5.0 years (5.0 ttl pk-yrs)     Types: Cigarettes     Start date: 1978     Quit date: 1983     Years since quittin.8    Smokeless tobacco: Never   Vaping Use    Vaping status: Never Used   Substance Use Topics    Alcohol use: Not Currently    Drug use: Never     E-Cigarette/Vaping    E-Cigarette Use Never User      E-Cigarette/Vaping Substances    Nicotine No     THC No     CBD No     Flavoring No     Other No     Unknown No        Family History: non-contributory    Meds/Allergies   Prior to Admission Medications   Prescriptions Last Dose Informant Patient Reported? Taking?   DULoxetine (CYMBALTA) 60 mg delayed release capsule   No No   Sig: TAKE 1 CAPSULE BY MOUTH EVERY DAY   OXcarbazepine (TRILEPTAL) 150 mg tablet   No No   Sig: TAKE 3 TABLETS (450 MG TOTAL) BY MOUTH 2 (TWO) TIMES A DAY   OXcarbazepine (TRILEPTAL) 600 mg tablet   No No   Sig: TAKE 1 TABLET BY MOUTH TWICE A DAY   amLODIPine (NORVASC) 2.5 mg tablet   No No   Sig: TAKE 1 TABLET BY MOUTH EVERY DAY   aspirin 325 mg tablet   Yes No   Sig: Take 325 mg by mouth daily   atorvastatin (LIPITOR) 80 mg tablet   No No   Sig: Take 1 tablet (80 mg total) by mouth daily with  dinner   baclofen 10 mg tablet   No No   Sig: TAKE 1 TABLET BY MOUTH IN THE MORNING, 1 TABLET MIDDAY, AND 1.5 TABLETS AT NIGHT   carvedilol (COREG) 25 mg tablet   No No   Sig: TAKE 1 TABLET BY MOUTH TWICE A DAY WITH FOOD   cyanocobalamin ([employer] Vitamin B-12) 1000 MCG tablet   No No   Sig: Take 1 tablet (1,000 mcg total) by mouth daily   ferrous sulfate 325 (65 Fe) mg tablet   No No   Sig: TAKE 1 TABLET BY MOUTH EVERY DAY WITH BREAKFAST   hydrALAZINE (APRESOLINE) 50 mg tablet   No No   Sig: TAKE 1 TABLET BY MOUTH EVERY 12 HOURS   losartan (COZAAR) 100 MG tablet   No No   Sig: TAKE 1 TABLET BY MOUTH EVERY DAY   naltrexone (REVIA) 50 mg tablet   No No   Sig: Please compound 4mg tablets to take by mouth QHS   pregabalin (LYRICA) 100 mg capsule   No No   Sig: TAKE 1 CAPSULE BY MOUTH 2 TIMES A DAY IN ADDITION TO 50MG IN THE AFTERNOON   pregabalin (LYRICA) 50 mg capsule   No No   Sig: TAKE 1 CAPSULE BY MOUTH IN THE AFTERNOON IN ADDITION TO THE 100MG TWICE DAILY AS DIRECTED      Facility-Administered Medications: None       No Known Allergies    PHYSICAL EXAM    PE limited by: nothing    Objective   Vitals:   First set: Temperature: 98.8 °F (37.1 °C) (01/16/25 1901)  Pulse: 67 (01/16/25 1901)  Respirations: 16 (01/16/25 1901)  Blood Pressure: (!) 198/86 (01/16/25 1901)  SpO2: 95 % (01/16/25 1901)    Primary Survey:   (A) Airway: patent  (B) Breathing: CTA b/l  (C) Circulation: Pulses:   normal  (D) Disabliity:  GCS Total:  15  (E) Expose:  Completed    Secondary Survey: (Click on Physical Exam tab above)  Physical Exam  Vitals and nursing note reviewed.   Constitutional:       Appearance: He is well-developed.   HENT:      Head: Normocephalic.     Eyes:      Extraocular Movements: Extraocular movements intact.      Pupils: Pupils are equal, round, and reactive to light.   Cardiovascular:      Rate and Rhythm: Normal rate and regular rhythm.      Heart sounds: Normal heart sounds.   Pulmonary:      Effort: Pulmonary effort is  ideation.  Extremities: No clubbing, cyanosis, or edema    Assessment/Plan   Recurrent depression (CMS-HCC) (Primary)  Will start him on Lexapro for his depression.  Patient will follow up in 4 weeks for reevaluation of the mood.  - escitalopram (Lexapro) 10 mg tablet; Take 1 tablet (10 mg) by mouth once daily.  Dispense: 30 tablet; Refill: 0  - Follow Up In [facility] - PCP - Established; Future    Vitamin D deficiency  Vitamin D was 22.7 on 5/12/2021 labs.   Ordered a Vitamin D to be done with his next labs.   - Vitamin D 25-Hydroxy,Total (for eval of Vitamin D levels); Future  - Follow Up In [facility] - PCP - Established; Future  - Vitamin D 25-Hydroxy,Total (for eval of Vitamin D levels)    Paresthesia of right arm  Ordered labs and EMG for evaluation.   Symptoms are suspicious for carpal tunnel.  - Vitamin B12; Future  - Folate; Future  - TSH with reflex to Free T4 if abnormal; Future  - EMG & nerve conduction; Future  - Follow Up In [facility] - PCP - Established; Future  - Vitamin B12  - Folate  - TSH with reflex to Free T4 if abnormal       Well adult exam  Appropriate screenings to the patient's current age were discusses at length. I encouraged a low-fat/low-cholesterol diet and routine exercise.   Ordered labs to establish a baseline.   - Comprehensive Metabolic Panel; Future  - Lipid Panel; Future  - CBC and Auto Differential; Future  - Follow Up In [facility] - PCP - Established; Future  - Comprehensive Metabolic Panel  - Lipid Panel  - CBC and Auto Differential          Scribe Attestation  By signing my name below, I, Martín Aguirre   attest that this documentation has been prepared under the direction and in the presence of Vj Westbrook DO.    Orders Placed This Encounter   Procedures    Vitamin B12    Folate    TSH with reflex to Free T4 if abnormal    Comprehensive Metabolic Panel    Lipid Panel    Vitamin D 25-Hydroxy,Total (for eval of Vitamin  D levels)    CBC and Auto Differential    EMG & nerve conduction     Requested Prescriptions     Signed Prescriptions Disp Refills    escitalopram (Lexapro) 10 mg tablet 30 tablet 0     Sig: Take 1 tablet (10 mg) by mouth once daily.        normal.      Breath sounds: Normal breath sounds.   Abdominal:      General: Bowel sounds are normal. There is no distension.      Palpations: Abdomen is soft. There is no mass.      Tenderness: There is no abdominal tenderness. There is no guarding or rebound.   Musculoskeletal:      Cervical back: No tenderness.   Skin:     General: Skin is warm and dry.      Capillary Refill: Capillary refill takes less than 2 seconds.   Neurological:      General: No focal deficit present.      Mental Status: He is alert and oriented to person, place, and time.   Psychiatric:         Behavior: Behavior normal.         Thought Content: Thought content normal.         Judgment: Judgment normal.         Cervical spine cleared by clinical criteria? No (imaging required)      Invasive Devices       None                   Lab Results:   Results Reviewed       Procedure Component Value Units Date/Time    Comprehensive metabolic panel [[id number]]  (Abnormal) Collected: 01/16/25 1922    Lab Status: Final result Specimen: Blood from Arm, Left Updated: 01/16/25 1941     Sodium 141 mmol/L      Potassium 3.9 mmol/L      Chloride 107 mmol/L      CO2 28 mmol/L      ANION GAP 6 mmol/L      BUN 36 mg/dL      Creatinine 1.44 mg/dL      Glucose 96 mg/dL      Calcium 8.5 mg/dL      AST 20 U/L      ALT 21 U/L      Alkaline Phosphatase 99 U/L      Total Protein 6.7 g/dL      Albumin 4.0 g/dL      Total Bilirubin 0.27 mg/dL      eGFR 50 ml/min/1.73sq m     Narrative:      National Kidney Disease Foundation guidelines for Chronic Kidney Disease (CKD):     Stage 1 with normal or high GFR (GFR > 90 mL/min/1.73 square meters)    Stage 2 Mild CKD (GFR = 60-89 mL/min/1.73 square meters)    Stage 3A Moderate CKD (GFR = 45-59 mL/min/1.73 square meters)    Stage 3B Moderate CKD (GFR = 30-44 mL/min/1.73 square meters)    Stage 4 Severe CKD (GFR = 15-29 mL/min/1.73 square meters)    Stage 5 End Stage CKD (GFR <15 mL/min/1.73 square meters)  Note: GFR calculation  is accurate only with a steady state creatinine    Protime-INR [[id number]]  (Normal) Collected: 01/16/25 1922    Lab Status: Final result Specimen: Blood from Arm, Left Updated: 01/16/25 1940     Protime 14.3 seconds      INR 1.06    Narrative:      INR Therapeutic Range    Indication                                             INR Range      Atrial Fibrillation                                               2.0-3.0  Hypercoagulable State                                    2.0.2.3  Left Ventricular Asist Device                            2.0-3.0  Mechanical Heart Valve                                  -    Aortic(with afib, MI, embolism, HF, LA enlargement,    and/or coagulopathy)                                     2.0-3.0 (2.5-3.5)     Mitral                                                             2.5-3.5  Prosthetic/Bioprosthetic Heart Valve               2.0-3.0  Venous thromboembolism (VTE: VT, PE        2.0-3.0    APTT [[id number]]  (Normal) Collected: 01/16/25 1922    Lab Status: Final result Specimen: Blood from Arm, Left Updated: 01/16/25 1940     PTT 29 seconds     CBC and differential [[id number]] Collected: 01/16/25 1922    Lab Status: Final result Specimen: Blood from Arm, Left Updated: 01/16/25 1927     WBC 8.14 Thousand/uL      RBC 4.44 Million/uL      Hemoglobin 12.3 g/dL      Hematocrit 38.4 %      MCV 87 fL      MCH 27.7 pg      MCHC 32.0 g/dL      RDW 13.1 %      MPV 9.3 fL      Platelets 229 Thousands/uL      nRBC 0 /100 WBCs      Segmented % 70 %      Immature Grans % 1 %      Lymphocytes % 15 %      Monocytes % 9 %      Eosinophils Relative 4 %      Basophils Relative 1 %      Absolute Neutrophils 5.69 Thousands/µL      Absolute Immature Grans 0.05 Thousand/uL      Absolute Lymphocytes 1.25 Thousands/µL      Absolute Monocytes 0.73 Thousand/µL      Eosinophils Absolute 0.36 Thousand/µL      Basophils Absolute 0.06 Thousands/µL                    Imaging Studies:   Direct to CT: Yes  CT head  without contrast   Final Result by Kalpesh Reich DO (01/16 2115)      No acute intracranial abnormality. Mild microangiopathic changes.      Soft tissue swelling overlying the right frontal calvarium. No underlying acute depressed calvarial fracture is identified.      Chronic sinusitis as above.      There is redemonstration of old right cerebellar infarct and left pontine infarcts.                     Workstation performed: [id number]         CT cervical spine without contrast   Final Result by Kalpesh Reich DO (01/16 2129)   Addendum (preliminary) 1 of 1 by Kalpesh Reich DO (01/16 2129)   ADDENDUM:      Incidentally noted is mild thickening of the esophageal walls which could    be due to nondistention, sequela of reflux, or esophagitis. Correlate    clinically.      Final      No acute vertebral body compression fracture. Stable degenerative changes from 10/10/2024 examination.      There is nonspecific groundglass opacities at the visualized lung apices bilaterally, which could be due to air trapping, pulmonary edema, or atypical pneumonia. Correlate clinically.                     Workstation performed: [id number]               Procedures  CriticalCare Time    Date/Time: 1/16/2025 9:00 PM    Performed by: Ronaldo Urena DO  Authorized by: Ronaldo Urena DO    Critical care provider statement:     Critical care time (minutes):  35    Critical care time was exclusive of:  Separately billable procedures and treating other patients and teaching time    Critical care was necessary to treat or prevent imminent or life-threatening deterioration of the following conditions:  Trauma    Critical care was time spent personally by me on the following activities:  Blood draw for specimens, obtaining history from patient or surrogate, development of treatment plan with patient or surrogate, evaluation of patient's response to treatment, examination of patient, ordering and performing treatments  and interventions, ordering and review of laboratory studies, ordering and review of radiographic studies and re-evaluation of patient's condition    I assumed direction of critical care for this patient from another provider in my specialty: no             ED Course  ED Course as of 01/17/25 0045   Thu Jan 16, 2025 2142 I reviewed CT report with patient and his wife.  He denies respiratory symptoms at this time.  Patient states that a few weeks ago, he had a cough for 1 night after eating a new bag of peanuts, and is concerned that he had an allergic reaction at that time.           Medical Decision Making  65-year-old male in the ED after mechanical fall.  Trauma evaluation activated.  CT head and cervical spine ordered and reviewed.  No acute traumatic pathology identified.  Nonspecific groundglass opacities noted in bilateral lung apices, however patient is asymptomatic.  Patient declines pain medication at the time of reevaluation.  Will discharge patient to home with recommendation for outpatient follow-up with primary care physician.  Patient and wife agree.      Amount and/or Complexity of Data Reviewed  Labs: ordered.  Radiology: ordered.                Disposition  [facility] Transfer: No  Final diagnoses:   Contusion of scalp, initial encounter   Closed head injury, initial encounter     Time reflects when diagnosis was documented in both MDM as applicable and the Disposition within this note       Time User Action Codes Description Comment    1/16/2025  9:40 PM Ronaldo Urena [S00.03XA] Contusion of scalp, initial encounter     1/16/2025  9:40 PM Ronaldo Urena [S09.90XA] Closed head injury, initial encounter           ED Disposition       ED Disposition   Discharge    Condition   Stable    Date/Time   Thu Jan 16, 2025  9:39 PM    Comment   Jimmy Mello discharge to home/self care.                   Follow-up Information       Follow up With Specialties Details Why Contact Info     [facility],  Family Medicine Schedule an appointment as soon as possible for a visit  for follow up [address] [place] [address]  [phone]            Discharge Medication List as of 1/16/2025  9:42 PM        CONTINUE these medications which have NOT CHANGED    Details   amLODIPine (NORVASC) 2.5 mg tablet TAKE 1 TABLET BY MOUTH EVERY DAY, Starting Mon 12/9/2024, Normal      aspirin 325 mg tablet Take 325 mg by mouth daily, Historical Med      atorvastatin (LIPITOR) 80 mg tablet Take 1 tablet (80 mg total) by mouth daily with dinner, Starting Tue 10/1/2024, Normal      baclofen 10 mg tablet TAKE 1 TABLET BY MOUTH IN THE MORNING, 1 TABLET MIDDAY, AND 1.5 TABLETS AT NIGHT, Normal      carvedilol (COREG) 25 mg tablet TAKE 1 TABLET BY MOUTH TWICE A DAY WITH FOOD, Starting Mon 12/9/2024, Normal      cyanocobalamin ([facility] Vitamin B-12) 1000 MCG tablet Take 1 tablet (1,000 mcg total) by mouth daily, Starting Fri 10/18/2024, Normal      DULoxetine (CYMBALTA) 60 mg delayed release capsule TAKE 1 CAPSULE BY MOUTH EVERY DAY, Starting Thu 10/10/2024, Normal      ferrous sulfate 325 (65 Fe) mg tablet TAKE 1 TABLET BY MOUTH EVERY DAY WITH BREAKFAST, Starting Fri 10/18/2024, Normal      hydrALAZINE (APRESOLINE) 50 mg tablet TAKE 1 TABLET BY MOUTH EVERY 12 HOURS, Starting Mon 12/9/2024, Normal      losartan (COZAAR) 100 MG tablet TAKE 1 TABLET BY MOUTH EVERY DAY, Starting Mon 12/9/2024, Normal      naltrexone (REVIA) 50 mg tablet Please compound 4mg tablets to take by mouth QHS, Normal      !! OXcarbazepine (TRILEPTAL) 150 mg tablet TAKE 3 TABLETS (450 MG TOTAL) BY MOUTH 2 (TWO) TIMES A DAY, Normal      !! OXcarbazepine (TRILEPTAL) 600 mg tablet TAKE 1 TABLET BY MOUTH TWICE A DAY, Starting Mon 12/9/2024, Normal      !! pregabalin (LYRICA) 100 mg capsule TAKE 1 CAPSULE BY MOUTH 2 TIMES A DAY IN ADDITION TO 50MG IN THE AFTERNOON, Normal      !! pregabalin (LYRICA) 50 mg capsule TAKE 1 CAPSULE BY MOUTH IN THE  AFTERNOON IN ADDITION TO THE 100MG TWICE DAILY AS DIRECTED, Normal       !! - Potential duplicate medications found. Please discuss with provider.        No discharge procedures on file.    PDMP Review         Value Time User    PDMP Reviewed  Yes 1/6/2025  2:58 PM Trey Del Real MD            ED Provider  Electronically Signed by           Ronaldo Urena DO  01/17/25 0046

## 2025-03-30 LAB
25(OH)D3+25(OH)D2 SERPL-MCNC: 19 NG/ML (ref 30–100)
ALBUMIN SERPL-MCNC: 4.7 G/DL (ref 3.6–5.1)
ALP SERPL-CCNC: 85 U/L (ref 36–130)
ALT SERPL-CCNC: 30 U/L (ref 9–46)
ANION GAP SERPL CALCULATED.4IONS-SCNC: 10 MMOL/L (CALC) (ref 7–17)
AST SERPL-CCNC: 21 U/L (ref 10–40)
BASOPHILS # BLD AUTO: 31 CELLS/UL (ref 0–200)
BASOPHILS NFR BLD AUTO: 0.5 %
BILIRUB SERPL-MCNC: 0.5 MG/DL (ref 0.2–1.2)
BUN SERPL-MCNC: 19 MG/DL (ref 7–25)
CALCIUM SERPL-MCNC: 9.2 MG/DL (ref 8.6–10.3)
CHLORIDE SERPL-SCNC: 105 MMOL/L (ref 98–110)
CHOLEST SERPL-MCNC: 147 MG/DL
CHOLEST/HDLC SERPL: 4.5 (CALC)
CO2 SERPL-SCNC: 26 MMOL/L (ref 20–32)
CREAT SERPL-MCNC: 0.81 MG/DL (ref 0.6–1.24)
EGFRCR SERPLBLD CKD-EPI 2021: 122 ML/MIN/1.73M2
EOSINOPHIL # BLD AUTO: 220 CELLS/UL (ref 15–500)
EOSINOPHIL NFR BLD AUTO: 3.6 %
ERYTHROCYTE [DISTWIDTH] IN BLOOD BY AUTOMATED COUNT: 13.4 % (ref 11–15)
FOLATE SERPL-MCNC: 10 NG/ML
GLUCOSE SERPL-MCNC: 99 MG/DL (ref 65–99)
HCT VFR BLD AUTO: 46.2 % (ref 38.5–50)
HDLC SERPL-MCNC: 33 MG/DL
HGB BLD-MCNC: 15 G/DL (ref 13.2–17.1)
LDLC SERPL CALC-MCNC: 91 MG/DL (CALC)
LYMPHOCYTES # BLD AUTO: 1610 CELLS/UL (ref 850–3900)
LYMPHOCYTES NFR BLD AUTO: 26.4 %
MCH RBC QN AUTO: 26.3 PG (ref 27–33)
MCHC RBC AUTO-ENTMCNC: 32.5 G/DL (ref 32–36)
MCV RBC AUTO: 80.9 FL (ref 80–100)
MONOCYTES # BLD AUTO: 421 CELLS/UL (ref 200–950)
MONOCYTES NFR BLD AUTO: 6.9 %
NEUTROPHILS # BLD AUTO: 3819 CELLS/UL (ref 1500–7800)
NEUTROPHILS NFR BLD AUTO: 62.6 %
NONHDLC SERPL-MCNC: 114 MG/DL (CALC)
PLATELET # BLD AUTO: 248 THOUSAND/UL (ref 140–400)
PMV BLD REES-ECKER: 10.9 FL (ref 7.5–12.5)
POTASSIUM SERPL-SCNC: 4.5 MMOL/L (ref 3.5–5.3)
PROT SERPL-MCNC: 7.5 G/DL (ref 6.1–8.1)
RBC # BLD AUTO: 5.71 MILLION/UL (ref 4.2–5.8)
SODIUM SERPL-SCNC: 141 MMOL/L (ref 135–146)
TRIGL SERPL-MCNC: 129 MG/DL
TSH SERPL-ACNC: 1.72 MIU/L (ref 0.4–4.5)
VIT B12 SERPL-MCNC: 502 PG/ML (ref 200–1100)
WBC # BLD AUTO: 6.1 THOUSAND/UL (ref 3.8–10.8)

## 2025-03-31 ENCOUNTER — APPOINTMENT (OUTPATIENT)
Dept: NEUROLOGY | Facility: HOSPITAL | Age: 30
End: 2025-03-31
Payer: COMMERCIAL

## 2025-04-17 DIAGNOSIS — F33.9 RECURRENT DEPRESSION (CMS-HCC): ICD-10-CM

## 2025-04-17 RX ORDER — ESCITALOPRAM OXALATE 10 MG/1
10 TABLET ORAL DAILY
Qty: 30 TABLET | Refills: 0 | OUTPATIENT
Start: 2025-04-17

## 2025-04-21 ENCOUNTER — APPOINTMENT (OUTPATIENT)
Dept: PRIMARY CARE | Facility: CLINIC | Age: 30
End: 2025-04-21
Payer: COMMERCIAL

## 2025-04-21 VITALS
WEIGHT: 296.19 LBS | TEMPERATURE: 98.5 F | DIASTOLIC BLOOD PRESSURE: 76 MMHG | BODY MASS INDEX: 43.87 KG/M2 | OXYGEN SATURATION: 94 % | HEIGHT: 69 IN | HEART RATE: 88 BPM | SYSTOLIC BLOOD PRESSURE: 125 MMHG | RESPIRATION RATE: 16 BRPM

## 2025-04-21 DIAGNOSIS — E55.9 VITAMIN D DEFICIENCY: ICD-10-CM

## 2025-04-21 DIAGNOSIS — R20.2 PARESTHESIA OF RIGHT ARM: ICD-10-CM

## 2025-04-21 DIAGNOSIS — F33.9 RECURRENT DEPRESSION: ICD-10-CM

## 2025-04-21 PROCEDURE — 99213 OFFICE O/P EST LOW 20 MIN: CPT | Performed by: FAMILY MEDICINE

## 2025-04-21 PROCEDURE — 3008F BODY MASS INDEX DOCD: CPT | Performed by: FAMILY MEDICINE

## 2025-04-21 RX ORDER — ESCITALOPRAM OXALATE 10 MG/1
10 TABLET ORAL DAILY
Qty: 90 TABLET | Refills: 1 | Status: SHIPPED | OUTPATIENT
Start: 2025-04-21

## 2025-04-21 RX ORDER — VIT C/E/ZN/COPPR/LUTEIN/ZEAXAN 250MG-90MG
25 CAPSULE ORAL DAILY
Start: 2025-04-21 | End: 2026-04-21

## 2025-04-21 ASSESSMENT — PATIENT HEALTH QUESTIONNAIRE - PHQ9
1. LITTLE INTEREST OR PLEASURE IN DOING THINGS: NOT AT ALL
2. FEELING DOWN, DEPRESSED OR HOPELESS: NOT AT ALL
SUM OF ALL RESPONSES TO PHQ9 QUESTIONS 1 AND 2: 0

## 2025-04-21 NOTE — PATIENT INSTRUCTIONS
Follow up in 6 months.    It was a pleasure to see you today. Thank you for choosing us for your health care needs.    If you have lab or other testing completed and have not been informed of results within one week, please call the office for your results.    If you haven't done so, consider signing up for [facility] [email or web address], the [facility] personal health record. Ask the staff how you can get started.

## 2025-04-21 NOTE — ASSESSMENT & PLAN NOTE
Patient was started vitamin D at a dose of 1000 units daily.  Orders:    Follow Up In [facility] - PCP - Established    cholecalciferol (Vitamin D3) 25 mcg (1,000 units) capsule; Take 1 capsule (25 mcg) by mouth once daily.    Follow Up In [facility] - PCP - Established; Future    Lab Results   Component Value Date    VITD25 19 (L) 03/29/2025

## 2025-04-21 NOTE — PROGRESS NOTES
"Subjective   Patient ID: Giles Moscoso is a 29 y.o. male who presents for Follow-up.    HPI  Patient presents for follow up on depression.  He was previously started on Lexapro.   Pt states the medication is working great for the depression.        Patient underwent evaluation of his paresthesia of the RUE.  Labs and EMG were ordered for further evaluation.  EMG was not completed as he decided he did not want to have it done since he would not consider any additional treatment or surgery at this time.  No weakness of RUE.     Patient has vitamin D deficiency.   Currently not taking a vitamin D supplement.     Patient had asthma in the past.   He states he had exacerbations as a child.   Denies any recent flare-ups.    Review of Systems  Constitutional: Patient denies any fever, chills, loss of appetite, or unexplained weight loss.  Cardiovascular: Patient denies any chest pain, shortness of breath with exertion, tachycardia, palpitations, orthopnea, or paroxysmal nocturnal dyspnea.  Respiratory: Patient denies any cough, shortness breath, or wheezing.  Gastrointestinal: Patient denies any nausea, vomiting, diarrhea, constipation, melena, hematochezia, or reflux symptoms.  Skin: Denies any rashes or skin lesions.   Neurology: Patient denies any new motor or sensory losses.  Denies any numbness, tingling, weakness, and incoordination of the extremities.  Patient also denies any tremor, seizures, or gait instability.  Endocrinology: Denies any polyuria, polydipsia, polyphagia, or heat/cold intolerance.    Objective   /76   Pulse 88   Temp 36.9 °C (98.5 °F) (Oral)   Resp 16   Ht 1.753 m (5' 9\")   Wt 134 kg (296 lb 3 oz)   SpO2 94%   BMI 43.74 kg/m²     Physical Exam  General Appearance: Alert and cooperative, in no acute distress, well-developed/well-nourished overweight male.  Neck: Supple and without adenopathy or rigidity.  There is no JVD at 90° and no carotid bruits are noted.  There is no " thyromegaly, thyroid tenderness, or palpable thyroid nodules.  Heart: Regular rate and rhythm without murmur or ectopy.  Respiratory: Lungs are clear to auscultation bilaterally with good air exchange.  Good respiratory effort and no accessory muscle use.  Skin: Good turgor, moist, warm and without rashes or lesions.  Neurological exam: Alert and oriented ×3, no tremor, normal gait.  Extremities: No clubbing, cyanosis, or edema  Psychiatric: Appropriate mood and affect, good insight and judgment, no delusions or thought disorders, no suicidal or homicidal ideation    Assessment/Plan   Assessment & Plan  Recurrent depression (CMS-HCC)  He has responded well to the Lexapro and we will continue with the same.  Orders:    Follow Up [facility] - PCP - Established    escitalopram (Lexapro) 10 mg tablet; Take 1 tablet (10 mg) by mouth once daily.    Follow Up [facility] - PCP - Established; Future    Vitamin D deficiency  Patient was started vitamin D at a dose of 1000 units daily.  Orders:    Follow Up [facility] - PCP - Established    cholecalciferol (Vitamin D3) 25 mcg (1,000 units) capsule; Take 1 capsule (25 mcg) by mouth once daily.    Follow Up [facility] - PCP - Established; Future    Lab Results   Component Value Date    VITD25 19 (L) 03/29/2025         Paresthesia of right arm  Symptoms are stable.  Pt has declined to proceed with the EMG which was ordered at his last visit as he does not want to proceed with any treatment should findings be abnormal.  Orders:    Follow Up [facility] - PCP - Established    Follow Up [facility] - PCP - Established; Future      Requested Prescriptions     Signed Prescriptions Disp Refills    escitalopram (Lexapro) 10 mg tablet 90 tablet 1     Sig: Take 1 tablet (10 mg) by mouth once daily.    cholecalciferol (Vitamin D3) 25 mcg (1,000 units) capsule       Sig: Take 1 capsule (25 mcg) by mouth once  daily.

## 2025-05-20 ENCOUNTER — APPOINTMENT (OUTPATIENT)
Dept: PRIMARY CARE | Facility: CLINIC | Age: 30
End: 2025-05-20
Payer: COMMERCIAL

## 2025-08-11 ENCOUNTER — OFFICE VISIT (OUTPATIENT)
Age: 30
End: 2025-08-11
Payer: COMMERCIAL

## 2025-08-11 VITALS
WEIGHT: 286 LBS | TEMPERATURE: 98.8 F | HEART RATE: 95 BPM | OXYGEN SATURATION: 97 % | DIASTOLIC BLOOD PRESSURE: 78 MMHG | SYSTOLIC BLOOD PRESSURE: 126 MMHG | HEIGHT: 69 IN | BODY MASS INDEX: 42.36 KG/M2

## 2025-08-11 DIAGNOSIS — L25.5 RHUS DERMATITIS: Primary | ICD-10-CM

## 2025-08-11 DIAGNOSIS — L03.115 CELLULITIS OF RIGHT LOWER EXTREMITY: ICD-10-CM

## 2025-08-11 PROCEDURE — 99213 OFFICE O/P EST LOW 20 MIN: CPT | Performed by: NURSE PRACTITIONER

## 2025-08-11 RX ORDER — ESCITALOPRAM OXALATE 5 MG/1
TABLET ORAL
COMMUNITY

## 2025-08-11 RX ORDER — PREDNISONE 10 MG/1
TABLET ORAL
Qty: 44 TABLET | Refills: 0 | Status: SHIPPED | OUTPATIENT
Start: 2025-08-11

## 2025-08-11 RX ORDER — CETIRIZINE HYDROCHLORIDE 10 MG/1
10 TABLET ORAL DAILY
Qty: 7 TABLET | Refills: 0 | Status: SHIPPED | OUTPATIENT
Start: 2025-08-11 | End: 2025-08-18

## 2025-08-11 RX ORDER — DOXYCYCLINE HYCLATE 100 MG
100 TABLET ORAL 2 TIMES DAILY
Qty: 14 TABLET | Refills: 0 | Status: SHIPPED | OUTPATIENT
Start: 2025-08-11 | End: 2025-08-18

## 2025-08-12 ASSESSMENT — ENCOUNTER SYMPTOMS
STRIDOR: 0
WHEEZING: 0
VOMITING: 0
FACIAL SWELLING: 0
NAUSEA: 0
TROUBLE SWALLOWING: 0
SHORTNESS OF BREATH: 0
COUGH: 0
CHEST TIGHTNESS: 1

## 2025-10-18 ENCOUNTER — APPOINTMENT (OUTPATIENT)
Dept: PRIMARY CARE | Facility: CLINIC | Age: 30
End: 2025-10-18
Payer: COMMERCIAL

## 2025-12-20 ENCOUNTER — APPOINTMENT (OUTPATIENT)
Dept: PRIMARY CARE | Facility: CLINIC | Age: 30
End: 2025-12-20
Payer: COMMERCIAL